# Patient Record
Sex: MALE | Race: WHITE | NOT HISPANIC OR LATINO | Employment: OTHER | URBAN - METROPOLITAN AREA
[De-identification: names, ages, dates, MRNs, and addresses within clinical notes are randomized per-mention and may not be internally consistent; named-entity substitution may affect disease eponyms.]

---

## 2017-01-03 DIAGNOSIS — R40.20 LOC (LOSS OF CONSCIOUSNESS): Primary | ICD-10-CM

## 2017-01-04 ENCOUNTER — OFFICE VISIT (OUTPATIENT)
Dept: ENDOCRINOLOGY | Facility: CLINIC | Age: 72
End: 2017-01-04
Payer: MEDICARE

## 2017-01-04 VITALS
SYSTOLIC BLOOD PRESSURE: 122 MMHG | HEIGHT: 61 IN | DIASTOLIC BLOOD PRESSURE: 68 MMHG | HEART RATE: 84 BPM | BODY MASS INDEX: 32.84 KG/M2 | WEIGHT: 173.94 LBS

## 2017-01-04 DIAGNOSIS — E11.40 TYPE 2 DIABETES MELLITUS WITH DIABETIC NEUROPATHY, WITHOUT LONG-TERM CURRENT USE OF INSULIN: Primary | ICD-10-CM

## 2017-01-04 DIAGNOSIS — Z79.4 INSULIN LONG-TERM USE: ICD-10-CM

## 2017-01-04 DIAGNOSIS — I10 ESSENTIAL HYPERTENSION: ICD-10-CM

## 2017-01-04 DIAGNOSIS — R41.3 MEMORY LOSS OR IMPAIRMENT: ICD-10-CM

## 2017-01-04 DIAGNOSIS — R80.9 PROTEINURIA: ICD-10-CM

## 2017-01-04 DIAGNOSIS — N18.30 CKD (CHRONIC KIDNEY DISEASE) STAGE 3, GFR 30-59 ML/MIN: ICD-10-CM

## 2017-01-04 PROCEDURE — 1159F MED LIST DOCD IN RCRD: CPT | Mod: S$GLB,,, | Performed by: NURSE PRACTITIONER

## 2017-01-04 PROCEDURE — 99999 PR PBB SHADOW E&M-EST. PATIENT-LVL IV: CPT | Mod: PBBFAC,,, | Performed by: NURSE PRACTITIONER

## 2017-01-04 PROCEDURE — 3078F DIAST BP <80 MM HG: CPT | Mod: S$GLB,,, | Performed by: NURSE PRACTITIONER

## 2017-01-04 PROCEDURE — 99214 OFFICE O/P EST MOD 30 MIN: CPT | Mod: S$GLB,,, | Performed by: NURSE PRACTITIONER

## 2017-01-04 PROCEDURE — 1160F RVW MEDS BY RX/DR IN RCRD: CPT | Mod: S$GLB,,, | Performed by: NURSE PRACTITIONER

## 2017-01-04 PROCEDURE — 3074F SYST BP LT 130 MM HG: CPT | Mod: S$GLB,,, | Performed by: NURSE PRACTITIONER

## 2017-01-04 PROCEDURE — 3046F HEMOGLOBIN A1C LEVEL >9.0%: CPT | Mod: S$GLB,,, | Performed by: NURSE PRACTITIONER

## 2017-01-04 PROCEDURE — 2022F DILAT RTA XM EVC RTNOPTHY: CPT | Mod: S$GLB,,, | Performed by: NURSE PRACTITIONER

## 2017-01-04 PROCEDURE — 1157F ADVNC CARE PLAN IN RCRD: CPT | Mod: S$GLB,,, | Performed by: NURSE PRACTITIONER

## 2017-01-04 PROCEDURE — 1126F AMNT PAIN NOTED NONE PRSNT: CPT | Mod: S$GLB,,, | Performed by: NURSE PRACTITIONER

## 2017-01-04 PROCEDURE — 4010F ACE/ARB THERAPY RXD/TAKEN: CPT | Mod: S$GLB,,, | Performed by: NURSE PRACTITIONER

## 2017-01-04 NOTE — MR AVS SNAPSHOT
[facility] Endocrinology  [address] [facility] [address] [place] [address]  Phone: [phone]  Fax: [phone]                  Chema Ambrose   2017 7:30 AM   Office Visit    Description:  Male : 1945   Provider:  TALIB Kelsey,ANP-C   Department:  [place] - Endocrinology           Reason for Visit     Diabetes Mellitus           Diagnoses this Visit        Comments    Type 2 diabetes mellitus with diabetic neuropathy, without long-term current use of insulin    -  Primary     Essential hypertension         CKD (chronic kidney disease) stage 3, GFR 30-59 ml/min         Proteinuria         Dystrophic nail         Insulin long-term use                To Do List           Future Appointments        Provider Department Dept Phone    2017 10:00 AM Pari Addison MD [facility] [phone]    2017 10:00 AM Jelani Grajeda MD [facility] Cardiology [phone]    2/3/2017 3:00 PM TALIB Kelsey,ANP-C [facility] Endocrinology [phone]    2017 11:00 AM Shahriar Moore DPM [facility] Podiatry [phone]    3/30/2017 10:00 AM [facility] [place] [facility] [phone]      Goals (5 Years of Data)     None      [facility]     [facility] - 24/7 Assistance  Registered nurses in the [facility] provide clinical advisement, health education, appointment booking, and other advisory services.  Call for this free service at [phone].             Medications           Message regarding Medications     Verify the changes and/or additions to your medication regime listed below are the same as discussed with your clinician today.  If any of these changes or additions are incorrect, please notify your healthcare provider.             Verify that the below list of medications is an accurate representation of the medications you are currently taking.  If none reported, the list may be blank. If incorrect,  "please contact your healthcare provider. Carry this list with you in case of emergency.           Current Medications     albuterol (PROVENTIL/VENTOLIN) 90 mcg/Actuation inhaler Inhale 2 puffs into the lungs every 4 (four) hours as needed. Inhale every 4 to 6 hours as needed for wheezing/cough     albuterol 90 mcg/actuation inhaler Inhale 2 puffs into the lungs.    aspirin (ECOTRIN) 81 MG EC tablet Take 81 mg by mouth.    atorvastatin (LIPITOR) 40 MG tablet Take 1 tablet (40 mg total) by mouth once daily.    blood glucose control, normal Soln     blood sugar diagnostic Strp TRue result test strips.  Pt on insulin checks TID.    BREEZE 2 TEST STRIPS Strp use as directed twice a day    CALCIUM CARBONATE/VITAMIN D3 (OS-KALEIGH 500 + D ORAL) Three times a day    cholecalciferol, vitamin D3, (VITAMIN D3) 2,000 unit Cap Take by mouth.    fluticasone (FLONASE) 50 mcg/actuation nasal spray INHALE 1 SPRAY INTO EACH NOSTRIL EVERY DAY -APPOINTMENT NEEDED FOR FUTURE FILLS-    fluticasone-vilanterol (BREO) 200-25 mcg/dose DsDv diskus inhaler Inhale 1 puff into the lungs once daily.    insulin glargine (LANTUS) 100 unit/mL injection Inject 30 Units into the skin every evening.    insulin NPH-insulin regular, 70/30, (NOVOLIN 70/30) 100 unit/mL (70-30) injection Take 80  units 30 min before breakfast, Take 35 units 30 min before supper    lisinopril-hydrochlorothiazide (PRINZIDE,ZESTORETIC) 10-12.5 mg per tablet Take 1 tablet by mouth once daily.    metoprolol succinate (TOPROL-XL) 25 MG 24 hr tablet Take 1 tablet (25 mg total) by mouth once daily.    mometasone (ASMANEX TWISTHALER) 220 mcg (30 doses) inhaler Inhale 2 puffs into the lungs every evening.    pantoprazole (PROTONIX) 40 MG tablet TAKE 1 TABLET (40 MG TOTAL) BY MOUTH ONCE DAILY    TRUEPLUS INSULIN 1/2 mL 31 x 5/16" Syrg USE TWICE A DAY WITH INSULIN           Clinical Reference Information           Vital Signs - Last Recorded  Most recent update: 1/4/2017  7:33 AM by " "Daniela Bautista, LPN    BP Pulse Ht Wt BMI    122/68 (BP Location: Right arm, Patient Position: Sitting, BP Method: Manual) 84 5' 1" (1.549 m) 78.9 kg (173 lb 15.1 oz) 32.87 kg/m2      Blood Pressure          Most Recent Value    BP  122/68      Allergies as of 1/4/2017     No Known Allergies      Immunizations Administered on Date of Encounter - 1/4/2017     None      Orders Placed During Today's Visit     Future Labs/Procedures Expected by Expires    Hemoglobin A1c  1/4/2017 1/4/2018      [facility] Sign-Up     Activating your [facility] account is as easy as 1-2-3!     1) Visit [email or web address], select Sign Up Now, enter this activation code and your date of birth, then select Next.  [email or web address]  Expires: 2/3/2017  5:53 PM      2) Create a username and password to use when you visit [facility] in the future and select a security question in case you lose your password and select Next.    3) Enter your e-mail address and click Sign Up!    Additional Information  If you have questions, please e-mail [email or web address] or call [phone] to talk to our [facility] staff. Remember, [facility] is NOT to be used for urgent needs. For medical emergencies, dial 911.         Instructions    MUST BRING GLUCOSE LOGS.    MUST CHECK AT LEAST TWICE A DAY AND ALTERNATE BETWEEN BREAKFAST AND SUPPER ONE DAY    LUNCH AND BEDTIME THE NEXT FOR AT LEAST 2 WEEKS BEFORE APPT.        "

## 2017-01-04 NOTE — PROGRESS NOTES
Subjective:      Patient ID: Chema Ambrose is a 71 y.o. male.    Chief Complaint:  Routine DM f/u     History of Present Illness  CHIEF COMPLAINT: Type 2 diabetes    Pt is a 68 y/o wm with Type 2 DM since at least 2004, as well as chronic conditions pending review including HTN, HLP, neuropathy, and Osteopenia. Pt with limited cognitive abilities, poor historian. .  Pt has previously stated   wife often does not cook, with a hx of eatting a lot of potted meat and fruit cups.      Interim Events:  No acute events.   Pt back today for glucose log  Review.  He has no glucoses log.  His wife deals with his medications etc but she opted to stay in waiting room.  No change in ROS.  States only checking qam and does not remember numbers.  States compliant with insulin.         Diabetes Flow Sheet:   Diabetes Medications:  Novolin 70/30--66 units before breakfast, 30 before supper.   Prior Regimen--- Novolog 70/30  30 with breakfast, 40 with supper        suspected pt missing most shots. :Lantus 22 and NovoLog 10/12/12 with meals   Diabetes Complications: neuropathy   Aspirin: yes   Statin: yes- crestor   ACE/ARB: yes   Last Urine Microalbumin: 5/13   Last Eye exam: 8/13- [facility]   Last Diabetic Education:         PAST MEDICAL HISTORY: Type 2 diabetes for 10 years with neuropathy, osteopenia, hyperlipidemia, hypertension, GERD, sleep apnea using a CPAP machine, obesity, low testosterone    PAST SURGICAL HISTORY: Right CEA. Left CEA    SOCIAL HISTORY: He does use chewing tobacco. No alcohol use    FAMILY HISTORY: Diabetes      Review of Systems   Constitutional: Negative for activity change, appetite change, fatigue and unexpected weight change.   HENT: Negative for hearing loss and trouble swallowing.    Eyes: Negative for photophobia and visual disturbance.        Eye exam--States 2016--outside eye doc   Respiratory: Negative for cough and shortness of breath.    Cardiovascular: Negative for chest pain, palpitations  "and leg swelling.   Gastrointestinal: Positive for constipation (occassional ). Negative for diarrhea.   Endocrine: Negative for polydipsia and polyuria (polyuria much improved. ).   Genitourinary: Negative for difficulty urinating and frequency.   Musculoskeletal: Negative for arthralgias and joint swelling.   Skin: Negative for rash and wound.   Neurological: Positive for numbness (feet). Negative for weakness.   Psychiatric/Behavioral: Negative for agitation and sleep disturbance. The patient is not nervous/anxious.        Objective:   Physical Exam   Constitutional: He is oriented to person, place, and time. He appears well-developed and well-nourished. No distress.   Appears approx age though fragile, borderline disheveled.    HENT:   Head: Normocephalic and atraumatic.   Nose: Nose normal.   Mouth/Throat: Oropharynx is clear and moist.   Eyes: Conjunctivae and EOM are normal. Pupils are equal, round, and reactive to light.   Neck: Normal range of motion. Neck supple. No tracheal deviation present. No thyromegaly present.   Cardiovascular: Normal rate and regular rhythm.  Exam reveals no friction rub.    Murmur heard.  Pulmonary/Chest: Effort normal and breath sounds normal. No respiratory distress. He has no wheezes.   Musculoskeletal: Normal range of motion. He exhibits no edema.        Lymphadenopathy:     He has no cervical adenopathy.   Neurological: He is alert and oriented to person, place, and time. He has normal reflexes. No cranial nerve deficit. He exhibits normal muscle tone. Coordination normal.   Skin: Skin is warm and dry. No rash noted. He is not diaphoretic. No erythema.   Psychiatric: He has a normal mood and affect. His behavior is normal. Judgment and thought content normal.     Vital Signs  Pulse: 84  BP: 122/68  BP Location: Right arm  BP Method: Manual  Patient Position: Sitting  Pain Score: 0-No pain  Height and Weight  Height: 5' 1" (154.9 cm)  Weight: 78.9 kg (173 lb 15.1 oz)  BSA " (Calculated - sq m): 1.84 sq meters  BMI (Calculated): 32.9  Weight in (lb) to have BMI = 25: 132]      Lab Review:     Chemistry        Component Value Date/Time     12/27/2016 1149    K 4.8 12/27/2016 1149     12/27/2016 1149    CO2 25 12/27/2016 1149    BUN 24 (H) 12/27/2016 1149    CREATININE 1.3 12/27/2016 1149     (H) 12/27/2016 1149        Component Value Date/Time    CALCIUM 9.2 12/27/2016 1149    ALKPHOS 115 12/27/2016 1149    AST 36 12/27/2016 1149    ALT 44 12/27/2016 1149    BILITOT 1.4 (H) 12/27/2016 1149        Hemoglobin A1C   Date Value Ref Range Status   12/15/2016 11.6 (H) 0.0 - 5.6 % Final     Comment:     Reference Interval:  5.0 - 5.6 Normal   5.7 - 6.4 High Risk   > 6.5 Diabetic    Hgb A1c results are standardized based on the (NGSP) National   Glycohemoglobin Standardization Program.    Hemoglobin A1C levels are related to mean serum/plasma glucose   during the preceding 2-3 months.        12/15/2016 11.2 (H) 4.5 - 6.2 % Final     Comment:     According to ADA guidelines, hemoglobin A1C <7.0% represents  optimal control in non-pregnant diabetic patients.  Different  metrics may apply to specific populations.   Standards of Medical Care in Diabetes - 2016.  For the purpose of screening for the presence of diabetes:  <5.7%     Consistent with the absence of diabetes  5.7-6.4%  Consistent with increasing risk for diabetes   (prediabetes)  >or=6.5%  Consistent with diabetes  Currently no consensus exists for use of hemoglobin A1C  for diagnosis of diabetes for children.     10/05/2016 9.5 (H) 4.5 - 6.2 % Final     Comment:     According to ADA guidelines, hemoglobin A1C <7.0% represents  optimal control in non-pregnant diabetic patients.  Different  metrics may apply to specific populations.   Standards of Medical Care in Diabetes - 2016.  For the purpose of screening for the presence of diabetes:  <5.7%     Consistent with the absence of diabetes  5.7-6.4%  Consistent with  increasing risk for diabetes   (prediabetes)  >or=6.5%  Consistent with diabetes  Currently no consensus exists for use of hemoglobin A1C  for diagnosis of diabetes for children.       Lab Results   Component Value Date    LDLCALC 58.0 (L) 12/16/2016     Lab Results   Component Value Date    TSH 2.300 12/15/2016     Component      Latest Ref Rng & Units 12/15/2016   Microalbum.,U,Random      ug/mL 874.0   Creatinine, Random Ur      23.0 - 375.0 mg/dL 114.0   Microalb Creat Ratio      0.0 - 30.0 ug/mg 766.7 (H)       Assessment:     1. Type 2 diabetes mellitus with diabetic neuropathy, without long-term current use of insulin  Hemoglobin A1c   2. Essential hypertension  -chronic-stable-cont ARB    3. CKD (chronic kidney disease) stage 3, GFR 30-59 ml/min  -qgkgyioo-etokvc-ludu ARB   4. Polyneuropathy associated with underlying disease  -chronic-stable-monitor foot care    5. Memory loss or impairment  -chronic-limits ability for tight glucose control    6. Hyperlipidemia, unspecified hyperlipidemia type  -chronic-stable-cont statin    7. Proteinuria  -chronic-stable-cont ARB               Plan:     No change    Conintue    Novolin 70/30-----  (this is NOT Novolog 70/30)   66 units 30 min before breakfast    Continue supper dose to 30 units   30 min before supper-    Written instructions for wife:   MUST BRING GLUCOSE LOGS.    MUST CHECK AT LEAST TWICE A DAY AND ALTERNATE BETWEEN BREAKFAST AND SUPPER ONE DAY    LUNCH AND BEDTIME THE NEXT FOR AT LEAST 2 WEEKS BEFORE APPT.       ORDERS    1/4/17  3 mo with a1c prior

## 2017-01-04 NOTE — PATIENT INSTRUCTIONS
MUST BRING GLUCOSE LOGS.    MUST CHECK AT LEAST TWICE A DAY AND ALTERNATE BETWEEN BREAKFAST AND SUPPER ONE DAY    LUNCH AND BEDTIME THE NEXT FOR AT LEAST 2 WEEKS BEFORE APPT.

## 2017-01-06 ENCOUNTER — OFFICE VISIT (OUTPATIENT)
Dept: PRIMARY CARE CLINIC | Facility: CLINIC | Age: 72
End: 2017-01-06
Payer: MEDICARE

## 2017-01-06 ENCOUNTER — LAB VISIT (OUTPATIENT)
Dept: LAB | Facility: HOSPITAL | Age: 72
End: 2017-01-06
Attending: FAMILY MEDICINE
Payer: MEDICARE

## 2017-01-06 VITALS
HEART RATE: 37 BPM | HEIGHT: 61 IN | BODY MASS INDEX: 33.17 KG/M2 | WEIGHT: 175.69 LBS | TEMPERATURE: 98 F | DIASTOLIC BLOOD PRESSURE: 80 MMHG | RESPIRATION RATE: 18 BRPM | SYSTOLIC BLOOD PRESSURE: 181 MMHG

## 2017-01-06 DIAGNOSIS — I50.32 CHRONIC DIASTOLIC CONGESTIVE HEART FAILURE: ICD-10-CM

## 2017-01-06 DIAGNOSIS — J45.41 MODERATE PERSISTENT ASTHMA WITH ACUTE EXACERBATION: ICD-10-CM

## 2017-01-06 DIAGNOSIS — R55 POSTURAL DIZZINESS WITH PRESYNCOPE: ICD-10-CM

## 2017-01-06 DIAGNOSIS — Z91.148 NONCOMPLIANCE W/MEDICATION TREATMENT DUE TO INTERMIT USE OF MEDICATION: ICD-10-CM

## 2017-01-06 DIAGNOSIS — R40.20 LOC (LOSS OF CONSCIOUSNESS): ICD-10-CM

## 2017-01-06 DIAGNOSIS — R00.1 BRADYCARDIA: Primary | ICD-10-CM

## 2017-01-06 DIAGNOSIS — R42 POSTURAL DIZZINESS WITH PRESYNCOPE: ICD-10-CM

## 2017-01-06 LAB
ALBUMIN SERPL BCP-MCNC: 3.3 G/DL
ALP SERPL-CCNC: 99 U/L
ALT SERPL W/O P-5'-P-CCNC: 27 U/L
ANION GAP SERPL CALC-SCNC: 13 MMOL/L
AST SERPL-CCNC: 21 U/L
BASOPHILS # BLD AUTO: 0.04 K/UL
BASOPHILS NFR BLD: 0.4 %
BILIRUB SERPL-MCNC: 0.8 MG/DL
BUN SERPL-MCNC: 37 MG/DL
CALCIUM SERPL-MCNC: 9.1 MG/DL
CHLORIDE SERPL-SCNC: 104 MMOL/L
CO2 SERPL-SCNC: 25 MMOL/L
CREAT SERPL-MCNC: 1.4 MG/DL
DIFFERENTIAL METHOD: ABNORMAL
EOSINOPHIL # BLD AUTO: 0.1 K/UL
EOSINOPHIL NFR BLD: 1.2 %
ERYTHROCYTE [DISTWIDTH] IN BLOOD BY AUTOMATED COUNT: 13.7 %
EST. GFR  (AFRICAN AMERICAN): 57 ML/MIN/1.73 M^2
EST. GFR  (NON AFRICAN AMERICAN): 49 ML/MIN/1.73 M^2
GLUCOSE SERPL-MCNC: 87 MG/DL
HCT VFR BLD AUTO: 37 %
HGB BLD-MCNC: 12.6 G/DL
LYMPHOCYTES # BLD AUTO: 2.1 K/UL
LYMPHOCYTES NFR BLD: 21.3 %
MAGNESIUM SERPL-MCNC: 1.4 MG/DL
MCH RBC QN AUTO: 29.8 PG
MCHC RBC AUTO-ENTMCNC: 34.1 %
MCV RBC AUTO: 88 FL
MONOCYTES # BLD AUTO: 0.8 K/UL
MONOCYTES NFR BLD: 8.2 %
NEUTROPHILS # BLD AUTO: 6.9 K/UL
NEUTROPHILS NFR BLD: 68.9 %
PLATELET # BLD AUTO: 343 K/UL
PMV BLD AUTO: 9.6 FL
POTASSIUM SERPL-SCNC: 4.5 MMOL/L
PROT SERPL-MCNC: 6.8 G/DL
RBC # BLD AUTO: 4.23 M/UL
SODIUM SERPL-SCNC: 142 MMOL/L
WBC # BLD AUTO: 10.04 K/UL

## 2017-01-06 PROCEDURE — 99999 PR PBB SHADOW E&M-EST. PATIENT-LVL III: CPT | Mod: PBBFAC,,, | Performed by: FAMILY MEDICINE

## 2017-01-06 PROCEDURE — 99499 UNLISTED E&M SERVICE: CPT | Mod: S$GLB,,, | Performed by: FAMILY MEDICINE

## 2017-01-06 PROCEDURE — 80053 COMPREHEN METABOLIC PANEL: CPT | Mod: PO

## 2017-01-06 PROCEDURE — 99214 OFFICE O/P EST MOD 30 MIN: CPT | Mod: S$GLB,,, | Performed by: FAMILY MEDICINE

## 2017-01-06 PROCEDURE — 85025 COMPLETE CBC W/AUTO DIFF WBC: CPT | Mod: PO

## 2017-01-06 PROCEDURE — 83735 ASSAY OF MAGNESIUM: CPT | Mod: PO

## 2017-01-06 RX ORDER — HYDRALAZINE HYDROCHLORIDE 25 MG/1
25 TABLET, FILM COATED ORAL 2 TIMES DAILY
Qty: 60 TABLET | Refills: 2 | Status: SHIPPED | OUTPATIENT
Start: 2017-01-06 | End: 2017-04-12 | Stop reason: SDUPTHER

## 2017-01-06 NOTE — PROGRESS NOTES
"Subjective:       Patient ID: Chema Ambrose is a 71 y.o. male.    Chief Complaint: No chief complaint on file.    Transitional Care Note    Family and/or Caretaker present at visit?  Yes. wife  Diagnostic tests reviewed/disposition: I have reviewed all completed as well as pending diagnostic tests at the time of discharge.  Disease/illness education: acute copd   Home health/community services discussion/referrals: Patient has home health established at [facility].   Establishment or re-establishment of referral orders for community resources: No other necessary community resources.   Discussion with other health care providers: No discussion with other health care providers necessary.       *Again the same scenario, does not know any of his medications, not sure why he was in the hospital, very poor discharge situation  and high risk readmission for the patient  Disheveled, wife tries but poor medical insight and information as well  First question, to me" can i still chew tobacco" while his HR is 37, denies shortness of breath or lightheadedness    Patient was hospitalized at [facility] recently. I have the hospital records and reviewed them thoroughly. During our Priority Care visit, we discussed how patient has been doing post discharge and identified any post discharge needs and concerns. I have also personally in addition to my nurse have reviewed and reconciled every medication before and after discharge. The patient has a clear understanding of what medications are currently to be taken after time was spent clarifying or correcting the medication reconciliation. The coordination of care far as follow up appointments, home health, or DME services have been done. My priority care team nursing will do follow up 24 or 48 hour phone call after this visit.       Admit Date: 12/15/2016     Discharge Date and Time: 12/20/2016 6:46 PM     Attending Physician: Leigha Denney MD      Reason for " Admission:       Chief Complaint   Patient presents with    Loss of Consciousness       had labs done at [facility], was walking down the [place] and was swaying back and forth in the [address], brought back to sit down and 911 called.          Procedures Performed: * No surgery found *     Hospital Course: The patient is a 71 year old male with history of DM2, hypertension, hyperlipidemia, LISSETH, GERD, CKD stage 3, was admitted for near syncopal episode while at [facility] on 12/15 for blood draw. He was noted to be bradycardic and hypotensive and was sent to ER for further evaluation. CT scan of head showed subacute to chronic infarct in the R occipital lobe. Cardio and Neurology were consulted. MRI of brain showed chronic/remote ischemia with no acute finding. MRA of the brain and carotid doppler were unrevealing. ECHO showed N SF with diastolic dysfunction. Beta blocker was held and HR improved. He too complained of shortness of breath with wheezing for which Pulmo was consulted. He was given empiric IV steroids, bronchodilators and cough medications. His symptoms improved and steroids were tapered. Glucose was uncontrolled. His hemoglobin a1c is 11 and unable to give Novolin 70/30 in hospital. He was given Levemir and Novolog pre meals with ISS. DM educator and dietitian consulted. Patient follow up with endocrinologist. His home Lantus dose was increased and continued on his Novolin 70/30 dosage. He was instructed to check his glucose levels, record and show to Endocrinology. Patient was noted to be tachycardic and BP elevated. Lower dose metoprolol was started and HR ranged from 70-80. BP more controlled (off norvasc, hydralazine, lisinopril, losartan/hctz). He was instructed to check his BP daily, record and show to PCP. Patient was cleared to be discharged with stable vital signs.      Consults: cardiology, pulmonary/intensive care, DM educator, dietitian     Significant Diagnostic Studies: Labs:   BMP:  Sodium 140, K 4.3, chloride 101, bicarb 30, glucose 162, BUN 32, Creatinine 1.49, Ca 8.4  CBC: WBC 11.9, H/H 13.139.6, platelet 396  , Lipid Panel         Lab Results   Component Value Date     CHOL 109 (L) 12/16/2016     HDL 23 (L) 12/16/2016     LDLCALC 58.0 (L) 12/16/2016     TRIG 140 12/16/2016     CHOLHDL 21.1 12/16/2016   , Troponin   Recent Labs  Lab 12/16/16  0330   TROPONINI 0.013    and A1C: 11.6  TSH: 2.3, BNP: 554  ECHO:   1 - Normal left ventricular systolic function (EF 60-65%).     2 - Left ventricular diastolic dysfunction.     3 - Concentric hypertrophy.     4 - Mild aortic regurgitation.           Imaging Results                 X-Ray Chest PA And Lateral (Final result) Result time: 12/20/16 08:26:56          Final result by Ander Cheng MD (12/20/16 08:26:56)          Impression:           Partial resolution of the left midlung zone subsegmental atelectasis.        Electronically signed by: ANDER CHENG MD  Date:     12/20/16  Time:    08:26           Narrative:     History: Left upper lobe atelectasis.     Procedure: Chest 2 views     Findings:     Examination is compared with a study of 12/15/16.     There is partial clearing of the left mid and upper lobe subsegmental atelectasis since the previous study. Lungs are better inflated. Partial obscuration left heart border remains without significant change. There is no pleural effusion. Heart is at the upper limits of normal. There is atherosclerosis of the descending thoracic aorta.                           US Carotid Bilateral (Final result) Result time: 12/16/16 12:49:46          Final result by GABE Myers MD (12/16/16 12:49:46)          Impression:           No hemodynamically significant stenosis.        Electronically signed by: GABE MYRES MD  Date:     12/16/16  Time:    12:49           Narrative:     Exam: Bilateral carotid Doppler ultrasound     Indication: Near syncope     Technique: Bilateral carotid Doppler ultrasound was  performed.     Findings:     Peak systolic velocities measure up to 81 cm/s on the right and 108 cm/s on the left. End diastolic velocities are normal bilaterally. ICA/CCA ratios measure 1.0 on the right and 1.2 on the left. There is antegrade vertebral artery flow bilaterally. There is moderate atherosclerotic plaque right greater than left. No visible stenosis.                           MRA Brain without contrast (Final result) Result time: 12/15/16 15:39:37          Final result by Devang Soto MD (12/15/16 15:39:37)          Impression:        1. No acute vascular abnormality.         Electronically signed by: DEVANG SOTO MD  Date:     12/15/16  Time:    15:39           Narrative:     MRA of the brain without contrast     Indication: Possible CVA, unsteady gait     Technique:  3-D intracranial magnetic resonance angiography was performed. Source and mid images were presented for interpretation. There is some motion artifact on the study which degrades evaluation of fine detail.     Findings:  There is normal signal in the included vertebral and internal carotid arteries. The basilar, anterior cerebral, middle cerebral and posterior cerebral arteries are within normal limits. There is no visible aneurysm or significant stenosis. There is no focal brain parenchymal abnormality. A normal appearing right posterior communicate artery is visualized. There is no visible left posterior containing artery. There is volume loss and right occipital scoliosis.                           MRI Brain Without Contrast (Final result) Result time: 12/15/16 15:22:30          Final result by Devang Soto MD (12/15/16 15:22:30)     Impression:        1. No acute finding.  2. Findings consistent with chronic/remote ischemia.  3. Left maxillary mucosal thickening.         Electronically signed by: DEVANG SOTO MD  Date:     12/15/16  Time:    15:22           Narrative:     MRI BRAIN WITHOUT CONTRAST     Indication:  Unsteady gait     Comparison: CT of the head also performed today     Technique: Sequences obtained included sagittal T1, axial T2, axial proton density axial flair, axial diffusion-weighted, axial ADC, axial T1, and coronal flair images.      Findings:  Gliosis and encephalomalacia are seen in the right exit lobe consistent with remote trauma. There is no acute focal brain parenchymal and mildly.      Diffuse volume loss is present. High FLAIR signal foci in the periventricular white matter are nonspecific but probably related to chronic microvascular disease. Left maxillary mucosal thickening is present. This was not scanned on the CT from earlier today.                           X-Ray Chest 1 View (Final result) Result time: 12/15/16 14:53:30          Final result by Valorie Nails MD (12/15/16 14:53:30)          Impression:     Linear density on the left most consistent with atelectasis.        Electronically signed by: VALORIE NAILS MD  Date:     12/15/16  Time:    14:53           Narrative:     Chest 1 view     Clinical history syncope     This is compared to a previous study from 8/13/14     There is linear density on the left most consistent with atelectasis. Heart is enlarged. Costophrenic angles are clear. There is vascular calcification noted.                           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        Electronically signed by: LUC POZO MD  Date:     12/15/16  Time:    13:17      Narrative:     Procedure: CT of the head without IV contrast.     Technique: Axial images of the head were obtained without intravenous contrast administration. Coronal and sagittal reconstructions were provided. Total DLP was 564 mGy-cm. Dose lowering technique, automated exposure control, was utilized for this exam.     History:  Unsteadiness.     Comparison: None.     Findings: There is normal brain formation. There is a small area of low density in the right occipital lobe. There is no hemorrhage. There is no hydrocephalus. There is no herniation.There is internal or extra axial fluid collection. There is no evidence of intracranial hypotension.     The calvarium is intact. There is no fracture. The bilateral lenses have been replaced. The paranasal sinuses and mastoid air cells are normally developed and well aerated.                   Final Diagnoses:   Principal Problem: Acute asthma exacerbation  Secondary Diagnoses:         Active Hospital Problems     Diagnosis   POA    *Acute asthma exacerbation [J45.901]   Yes    Postural dizziness with presyncope [R42, R55]   Yes    Bradycardia [R00.1]   Yes    Type 2 diabetes mellitus with neurologic complication [E11.49]   Yes    CKD (chronic kidney disease) stage 3, GFR 30-59 ml/min [N18.3]   Yes    Hyperlipidemia [E78.5]   Yes    Hypertension [I10]   Yes       Resolved Hospital Problems     Diagnosis Date Resolved POA   No resolved problems to display.         Discharged Condition: stable     Disposition: Home or Self Care       HPI  Review of Systems   Constitutional: Positive for activity change, appetite change, diaphoresis and fatigue.   HENT: Positive for congestion, dental problem and postnasal drip.    Eyes: Negative.    Respiratory: Positive for cough and chest tightness.    Cardiovascular: Positive for leg swelling.   Gastrointestinal: Positive for diarrhea, nausea and vomiting.   Endocrine: Positive for cold intolerance.   Genitourinary: Positive for difficulty urinating.   Musculoskeletal: Positive for arthralgias, back pain and gait problem.   Skin: Positive for color change.   Neurological: Positive for light-headedness.   Psychiatric/Behavioral: Positive for confusion, decreased concentration and dysphoric mood.       Objective:       Vitals:    01/06/17 1038   BP: (!)  "181/80   Pulse: (!) 37   Resp: 18   Temp: 97.8 °F (36.6 °C)   TempSrc: Oral   Weight: 79.7 kg (175 lb 11.3 oz)   Height: 5' 1" (1.549 m)       Physical Exam   Constitutional: He is oriented to person, place, and time. No distress.   HENT:   Head: Normocephalic.   Mouth/Throat: No oropharyngeal exudate.   Eyes: Right eye exhibits no discharge. No scleral icterus.   Neck: Normal range of motion. Neck supple.   Cardiovascular: Normal rate and regular rhythm.    No murmur heard.  Pulmonary/Chest:   Poor suboptimal effort   Abdominal: Soft. Bowel sounds are normal. He exhibits no distension. There is no tenderness.   Musculoskeletal: He exhibits edema and tenderness.   Neurological: He is alert and oriented to person, place, and time. He displays abnormal reflex. Coordination abnormal.   Skin: Skin is warm. He is not diaphoretic.         Lab Results   Component Value Date    WBC 10.04 01/06/2017    HGB 12.6 (L) 01/06/2017    HCT 37.0 (L) 01/06/2017    MCV 88 01/06/2017     01/06/2017     CMP  Sodium   Date Value Ref Range Status   01/06/2017 142 136 - 145 mmol/L Final     Potassium   Date Value Ref Range Status   01/06/2017 4.5 3.5 - 5.1 mmol/L Final     Chloride   Date Value Ref Range Status   01/06/2017 104 95 - 110 mmol/L Final     CO2   Date Value Ref Range Status   01/06/2017 25 23 - 29 mmol/L Final     Glucose   Date Value Ref Range Status   01/06/2017 87 70 - 110 mg/dL Final     BUN, Bld   Date Value Ref Range Status   01/06/2017 37 (H) 8 - 23 mg/dL Final     Creatinine   Date Value Ref Range Status   01/06/2017 1.4 0.5 - 1.4 mg/dL Final     Calcium   Date Value Ref Range Status   01/06/2017 9.1 8.7 - 10.5 mg/dL Final     Total Protein   Date Value Ref Range Status   01/06/2017 6.8 6.0 - 8.4 g/dL Final     Albumin   Date Value Ref Range Status   01/06/2017 3.3 (L) 3.5 - 5.2 g/dL Final     Total Bilirubin   Date Value Ref Range Status   01/06/2017 0.8 0.1 - 1.0 mg/dL Final     Comment:     For infants and " newborns, interpretation of results should be based  on gestational age, weight and in agreement with clinical  observations.  Premature Infant recommended reference ranges:  Up to 24 hours.............<8.0 mg/dL  Up to 48 hours............<12.0 mg/dL  3-5 days..................<15.0 mg/dL  6-29 days.................<15.0 mg/dL       Alkaline Phosphatase   Date Value Ref Range Status   01/06/2017 99 55 - 135 U/L Final     AST   Date Value Ref Range Status   01/06/2017 21 10 - 40 U/L Final     ALT   Date Value Ref Range Status   01/06/2017 27 10 - 44 U/L Final     Anion Gap   Date Value Ref Range Status   01/06/2017 13 8 - 16 mmol/L Final     eGFR if    Date Value Ref Range Status   01/06/2017 57 (A) >60 mL/min/1.73 m^2 Final     eGFR if non    Date Value Ref Range Status   01/06/2017 49 (A) >60 mL/min/1.73 m^2 Final     Comment:     Calculation used to obtain the estimated glomerular filtration  rate (eGFR) is the CKD-EPI equation. Since race is unknown   in our information system, the eGFR values for   -American and Non--American patients are given   for each creatinine result.         Assessment and Plan    1. Acute asthma exacerbation  Improving    2. Bradycardia, still there up to 50 s here now   not sure if he doubling up his beta blocker  Need home health to evaluate  Saw going around this crazy [place] again, the HH says they do not have a pill box, not good and even acceptable  Get one, arrange his meds  Stop toprol  Add hydralazine 25 mg bid    3. Chronic chf,diastolic  Shockingly compensated, not sure how that happened considering the messy post d/c state    Post discharge  High risk  Home health needs to be aggressive   Non compliance  Total disconnect post discharge state

## 2017-01-06 NOTE — MR AVS SNAPSHOT
[facility]  [address]  [place] [address]  Phone: [phone]                  Chema Ambrose   2017 10:00 AM   Office Visit    Description:  Male : 1945   Provider:  Pari Addison MD   Department:  [facility]           Diagnoses this Visit        Comments    Bradycardia    -  Primary     Postural dizziness with presyncope         Noncompliance w/medication treatment due to intermit use of medication         Moderate persistent asthma with acute exacerbation         Chronic diastolic congestive heart failure                To Do List           Future Appointments        Provider Department Dept Phone    2017 10:00 AM Jelain Grajeda MD [facility] Cardiology [phone]    2/3/2017 3:00 PM TALIB Kelsey,ANP-C [facility] Endocrinology [phone]    2017 11:00 AM Shahriar Moore DPM [facility] Podiatry [phone]    3/30/2017 10:00 AM LAB, [place] [facility] [phone]    2017 8:00 AM TALIB Kelsey,ANP-C [facility] Endocrinology [phone]      Goals (5 Years of Data)     None       These Medications        Disp Refills Start End    hydrALAZINE (APRESOLINE) 25 MG tablet 60 tablet 2 2017    Take 1 tablet (25 mg total) by mouth 2 (two) times daily. - Oral    Pharmacy: [facility] [address] #: [phone]         [facility]     [facility] Nurse Care Line -  Assistance  Registered nurses in the [facility] provide clinical advisement, health education, appointment booking, and other advisory services.  Call for this free service at [phone].             Medications           Message regarding Medications     Verify the changes and/or additions to your medication regime listed below are the same as discussed with your clinician today.  If any of these changes or additions are incorrect, please notify your  healthcare provider.        START taking these NEW medications        Refills    hydrALAZINE (APRESOLINE) 25 MG tablet 2    Sig: Take 1 tablet (25 mg total) by mouth 2 (two) times daily.    Class: Normal    Route: Oral      STOP taking these medications     metoprolol succinate (TOPROL-XL) 25 MG 24 hr tablet Take 1 tablet (25 mg total) by mouth once daily.           Verify that the below list of medications is an accurate representation of the medications you are currently taking.  If none reported, the list may be blank. If incorrect, please contact your healthcare provider. Carry this list with you in case of emergency.           Current Medications     albuterol (PROVENTIL/VENTOLIN) 90 mcg/Actuation inhaler Inhale 2 puffs into the lungs every 4 (four) hours as needed. Inhale every 4 to 6 hours as needed for wheezing/cough     albuterol 90 mcg/actuation inhaler Inhale 2 puffs into the lungs.    aspirin (ECOTRIN) 81 MG EC tablet Take 81 mg by mouth.    atorvastatin (LIPITOR) 40 MG tablet Take 1 tablet (40 mg total) by mouth once daily.    blood glucose control, normal Soln     blood sugar diagnostic Strp TRue result test strips.  Pt on insulin checks TID.    BREEZE 2 TEST STRIPS Strp use as directed twice a day    CALCIUM CARBONATE/VITAMIN D3 (OS-KALEIGH 500 + D ORAL) Three times a day    cholecalciferol, vitamin D3, (VITAMIN D3) 2,000 unit Cap Take by mouth.    fluticasone (FLONASE) 50 mcg/actuation nasal spray INHALE 1 SPRAY INTO EACH NOSTRIL EVERY DAY -APPOINTMENT NEEDED FOR FUTURE FILLS-    fluticasone-vilanterol (BREO) 200-25 mcg/dose DsDv diskus inhaler Inhale 1 puff into the lungs once daily.    insulin glargine (LANTUS) 100 unit/mL injection Inject 30 Units into the skin every evening.    insulin NPH-insulin regular, 70/30, (NOVOLIN 70/30) 100 unit/mL (70-30) injection Take 80  units 30 min before breakfast, Take 35 units 30 min before supper    lisinopril-hydrochlorothiazide (PRINZIDE,ZESTORETIC) 10-12.5 mg  "per tablet Take 1 tablet by mouth once daily.    mometasone (ASMANEX TWISTHALER) 220 mcg (30 doses) inhaler Inhale 2 puffs into the lungs every evening.    pantoprazole (PROTONIX) 40 MG tablet TAKE 1 TABLET (40 MG TOTAL) BY MOUTH ONCE DAILY    TRUEPLUS INSULIN 1/2 mL 31 x 5/16" Syrg USE TWICE A DAY WITH INSULIN    hydrALAZINE (APRESOLINE) 25 MG tablet Take 1 tablet (25 mg total) by mouth 2 (two) times daily.           Clinical Reference Information           Vital Signs - Last Recorded  Most recent update: 1/6/2017 10:43 AM by Daniela Pike MA    BP Pulse Temp Resp Ht Wt    (!) 181/80 (!) 37 97.8 °F (36.6 °C) (Oral) 18 5' 1" (1.549 m) 79.7 kg (175 lb 11.3 oz)    BMI                33.2 kg/m2          Blood Pressure          Most Recent Value    BP  (!)  181/80      Allergies as of 1/6/2017     No Known Allergies      Immunizations Administered on Date of Encounter - 1/6/2017     None      [facility] Sign-Up     Activating your [facility] account is as easy as 1-2-3!     1) Visit [email or web address], select Sign Up Now, enter this activation code and your date of birth, then select Next.  [email or web address]  Expires: 2/3/2017  5:53 PM      2) Create a username and password to use when you visit [facility] in the future and select a security question in case you lose your password and select Next.    3) Enter your e-mail address and click Sign Up!    Additional Information  If you have questions, please e-mail [email or web address] or call [phone] to talk to our [facility] staff. Remember, [facility] is NOT to be used for urgent needs. For medical emergencies, dial 911.         "

## 2017-01-07 ENCOUNTER — NURSE TRIAGE (OUTPATIENT)
Dept: ADMINISTRATIVE | Facility: CLINIC | Age: 72
End: 2017-01-07

## 2017-01-07 NOTE — TELEPHONE ENCOUNTER
Per home health nurse, pulse is 44; denies symptoms including lightheadedness and dizziness.    Pt seen in MD office yesterday with a pulse of 37 and home health ordered to see patient for consecutive days for monitoring of heart rate and  Organization of medications.  Toprol was discontinued.  Per home health orders, MD to be contacted for heart rate less than 50.    Dr. Terrazas (on call provider) notified of pt vitals and home health orders; okay to continue to monitor; however, if pt becomes symptomatic he is to go to the ED.  Message relayed to home health nurse.      Provider staff - please contact pt directly for additional information and/or recommendations    Reason for Disposition   [1] Heart beating very slowly (e.g.,  < 50 / minute)  (EXCEPTION: athlete)     Pt seen in MD office yesterday with same complaint - home health ordered; nurse currently with pt; on call provider notified for recommendations.    Protocols used: ST HEART RATE AND HEART BEAT [id number]

## 2017-01-12 RX ORDER — PANTOPRAZOLE SODIUM 40 MG/1
TABLET, DELAYED RELEASE ORAL
Qty: 30 TABLET | Refills: 5 | Status: SHIPPED | OUTPATIENT
Start: 2017-01-12 | End: 2017-08-16

## 2017-01-17 ENCOUNTER — OFFICE VISIT (OUTPATIENT)
Dept: CARDIOLOGY | Facility: CLINIC | Age: 72
End: 2017-01-17
Payer: MEDICARE

## 2017-01-17 VITALS
HEART RATE: 68 BPM | SYSTOLIC BLOOD PRESSURE: 196 MMHG | HEIGHT: 61 IN | BODY MASS INDEX: 33.68 KG/M2 | DIASTOLIC BLOOD PRESSURE: 78 MMHG | WEIGHT: 178.38 LBS

## 2017-01-17 DIAGNOSIS — I77.9 BILATERAL CAROTID ARTERY DISEASE: ICD-10-CM

## 2017-01-17 DIAGNOSIS — R42 POSTURAL DIZZINESS WITH PRESYNCOPE: ICD-10-CM

## 2017-01-17 DIAGNOSIS — E11.40 TYPE 2 DIABETES MELLITUS WITH DIABETIC NEUROPATHY, WITH LONG-TERM CURRENT USE OF INSULIN: ICD-10-CM

## 2017-01-17 DIAGNOSIS — N18.30 CKD (CHRONIC KIDNEY DISEASE) STAGE 3, GFR 30-59 ML/MIN: ICD-10-CM

## 2017-01-17 DIAGNOSIS — E78.5 HYPERLIPIDEMIA, UNSPECIFIED HYPERLIPIDEMIA TYPE: ICD-10-CM

## 2017-01-17 DIAGNOSIS — Z79.4 TYPE 2 DIABETES MELLITUS WITH DIABETIC NEUROPATHY, WITH LONG-TERM CURRENT USE OF INSULIN: ICD-10-CM

## 2017-01-17 DIAGNOSIS — R55 POSTURAL DIZZINESS WITH PRESYNCOPE: ICD-10-CM

## 2017-01-17 DIAGNOSIS — I10 ESSENTIAL HYPERTENSION: Primary | ICD-10-CM

## 2017-01-17 PROCEDURE — 1126F AMNT PAIN NOTED NONE PRSNT: CPT | Mod: S$GLB,,, | Performed by: INTERNAL MEDICINE

## 2017-01-17 PROCEDURE — 99214 OFFICE O/P EST MOD 30 MIN: CPT | Mod: S$GLB,,, | Performed by: INTERNAL MEDICINE

## 2017-01-17 PROCEDURE — 1157F ADVNC CARE PLAN IN RCRD: CPT | Mod: S$GLB,,, | Performed by: INTERNAL MEDICINE

## 2017-01-17 PROCEDURE — 1159F MED LIST DOCD IN RCRD: CPT | Mod: S$GLB,,, | Performed by: INTERNAL MEDICINE

## 2017-01-17 PROCEDURE — 3046F HEMOGLOBIN A1C LEVEL >9.0%: CPT | Mod: S$GLB,,, | Performed by: INTERNAL MEDICINE

## 2017-01-17 PROCEDURE — 1160F RVW MEDS BY RX/DR IN RCRD: CPT | Mod: S$GLB,,, | Performed by: INTERNAL MEDICINE

## 2017-01-17 PROCEDURE — 2022F DILAT RTA XM EVC RTNOPTHY: CPT | Mod: S$GLB,,, | Performed by: INTERNAL MEDICINE

## 2017-01-17 PROCEDURE — 3078F DIAST BP <80 MM HG: CPT | Mod: S$GLB,,, | Performed by: INTERNAL MEDICINE

## 2017-01-17 PROCEDURE — 99499 UNLISTED E&M SERVICE: CPT | Mod: S$GLB,,, | Performed by: INTERNAL MEDICINE

## 2017-01-17 PROCEDURE — 99999 PR PBB SHADOW E&M-EST. PATIENT-LVL III: CPT | Mod: PBBFAC,,, | Performed by: INTERNAL MEDICINE

## 2017-01-17 PROCEDURE — 4010F ACE/ARB THERAPY RXD/TAKEN: CPT | Mod: S$GLB,,, | Performed by: INTERNAL MEDICINE

## 2017-01-17 PROCEDURE — 3077F SYST BP >= 140 MM HG: CPT | Mod: S$GLB,,, | Performed by: INTERNAL MEDICINE

## 2017-01-17 RX ORDER — LISINOPRIL AND HYDROCHLOROTHIAZIDE 20; 25 MG/1; MG/1
1 TABLET ORAL DAILY
Qty: 90 TABLET | Refills: 3 | Status: SHIPPED | OUTPATIENT
Start: 2017-01-17 | End: 2017-08-16

## 2017-01-17 NOTE — MR AVS SNAPSHOT
[facility] Cardiology  [address] [facility] [address]  [facility] [address]  Phone: [phone]                  Chema Ambrose   2017 10:00 AM   Office Visit    Description:  Male : 1945   Provider:  Jelani Grajeda MD   Department:  [facility] - Cardiology           Reason for Visit     Loss of Consciousness     Shortness of Breath     Chronic Kidney Disease     Bradycardia           Diagnoses this Visit        Comments    Essential hypertension    -  Primary     Bilateral carotid artery disease         Postural dizziness with presyncope         Type 2 diabetes mellitus with diabetic neuropathy, with long-term current use of insulin         Hyperlipidemia, unspecified hyperlipidemia type         CKD (chronic kidney disease) stage 3, GFR 30-59 ml/min                To Do List           Future Appointments        Provider Department Dept Phone    2017 10:00 AM Jelani Grajeda MD [facility] Cardiology [phone]    2/3/2017 3:00 PM TALIB Kelsey,KAYLI [facility] Endocrinology [phone]    2017 11:00 AM Shahriar Moore DPM [facility] Podiatry [phone]    3/30/2017 10:00 AM MC, [place] [facility] [phone]    2017 8:00 AM TALIB Kelsey,KAYLI [facility] Endocrinology [phone]      Goals (5 Years of Data)     None      Follow-Up and Disposition     Return in about 3 months (around 2017).       These Medications        Disp Refills Start End    lisinopril-hydrochlorothiazide (PRINZIDE,ZESTORETIC) 20-25 mg Tab 90 tablet 3 2017    Take 1 tablet by mouth once daily. - Oral    Pharmacy: [facility] - [place] - [address] Ph #: [phone]         [facility]     [facility] On Call Nurse Care Line -  Assistance  Registered nurses in the [facility] On Call Center provide clinical advisement, health education, appointment booking, and other advisory services.  Call for this free service at  [phone].             Medications           Message regarding Medications     Verify the changes and/or additions to your medication regime listed below are the same as discussed with your clinician today.  If any of these changes or additions are incorrect, please notify your healthcare provider.        START taking these NEW medications        Refills    lisinopril-hydrochlorothiazide (PRINZIDE,ZESTORETIC) 20-25 mg Tab 3    Sig: Take 1 tablet by mouth once daily.    Class: Normal    Route: Oral      STOP taking these medications     lisinopril-hydrochlorothiazide (PRINZIDE,ZESTORETIC) 10-12.5 mg per tablet Take 1 tablet by mouth once daily.           Verify that the below list of medications is an accurate representation of the medications you are currently taking.  If none reported, the list may be blank. If incorrect, please contact your healthcare provider. Carry this list with you in case of emergency.           Current Medications     albuterol 90 mcg/actuation inhaler Inhale 2 puffs into the lungs.    aspirin (ECOTRIN) 81 MG EC tablet Take 81 mg by mouth.    atorvastatin (LIPITOR) 40 MG tablet Take 1 tablet (40 mg total) by mouth once daily.    blood glucose control, normal Soln     blood sugar diagnostic Strp TRue result test strips.  Pt on insulin checks TID.    BREEZE 2 TEST STRIPS Strp use as directed twice a day    CALCIUM CARBONATE/VITAMIN D3 (OS-KALEIGH 500 + D ORAL) Three times a day    cholecalciferol, vitamin D3, (VITAMIN D3) 2,000 unit Cap Take by mouth.    fluticasone (FLONASE) 50 mcg/actuation nasal spray INHALE 1 SPRAY INTO EACH NOSTRIL EVERY DAY -APPOINTMENT NEEDED FOR FUTURE FILLS-    fluticasone-vilanterol (BREO) 200-25 mcg/dose DsDv diskus inhaler Inhale 1 puff into the lungs once daily.    hydrALAZINE (APRESOLINE) 25 MG tablet Take 1 tablet (25 mg total) by mouth 2 (two) times daily.    insulin glargine (LANTUS) 100 unit/mL injection Inject 30 Units into the skin every evening.     "insulin NPH-insulin regular, 70/30, (NOVOLIN 70/30) 100 unit/mL (70-30) injection Take 80  units 30 min before breakfast, Take 35 units 30 min before supper    mometasone (ASMANEX TWISTHALER) 220 mcg (30 doses) inhaler Inhale 2 puffs into the lungs every evening.    pantoprazole (PROTONIX) 40 MG tablet TAKE 1 TABLET (40 MG TOTAL) BY MOUTH ONCE DAILY    TRUEPLUS INSULIN 1/2 mL 31 x 5/16" Syrg USE TWICE A DAY WITH INSULIN    albuterol (PROVENTIL/VENTOLIN) 90 mcg/Actuation inhaler Inhale 2 puffs into the lungs every 4 (four) hours as needed. Inhale every 4 to 6 hours as needed for wheezing/cough     lisinopril-hydrochlorothiazide (PRINZIDE,ZESTORETIC) 20-25 mg Tab Take 1 tablet by mouth once daily.           Clinical Reference Information           Vital Signs - Last Recorded  Most recent update: 1/17/2017  9:27 AM by Tonia Casas LPN    BP Pulse Ht Wt BMI    (!) 196/78 68 5' 1" (1.549 m) 80.9 kg (178 lb 5.6 oz) 33.7 kg/m2      Blood Pressure          Most Recent Value    BP  (!)  196/78      Allergies as of 1/17/2017     No Known Allergies      Immunizations Administered on Date of Encounter - 1/17/2017     None      [facility] Sign-Up     Activating your [facility] account is as easy as 1-2-3!     1) Visit [email or web address], select Sign Up Now, enter this activation code and your date of birth, then select Next.  [email or web address]  Expires: 2/3/2017  5:53 PM      2) Create a username and password to use when you visit [facility] in the future and select a security question in case you lose your password and select Next.    3) Enter your e-mail address and click Sign Up!    Additional Information  If you have questions, please e-mail [email or web address] or call [phone] to talk to our [facility] staff. Remember, [facility] is NOT to be used for urgent needs. For medical emergencies, dial 911.         "

## 2017-01-17 NOTE — PROGRESS NOTES
Subjective:    Patient ID:  Chema Ambrose is a 71 y.o. male who presents for follow-up of Loss of Consciousness (hosp f/u); Shortness of Breath; Chronic Kidney Disease; and Bradycardia      HPI Comments: Pt here for hospital f/u. Last month he was admitted with a near syncopal spell after a blood draw. He was noted to be a bit bradycardic and his toprol was stopped. Echo was unremarkable and unchanged from previous study. He reports no similar symptoms prior to or since the event. He has occasional postural lightheadedness. He denies any palpitations, syncope or pre-syncope. He denies rapid or sustained rhythms. He denies any chest pain, SOB, PND, orthopnea. He denies claudication, lower extremity edema.      Review of Systems   Constitution: Negative for weight gain and weight loss.   HENT: Negative.    Eyes: Negative.    Cardiovascular: Negative for chest pain, claudication, cyanosis, dyspnea on exertion, irregular heartbeat, leg swelling, near-syncope, orthopnea (no PND), palpitations and syncope.   Respiratory: Negative for cough, hemoptysis, shortness of breath and snoring.    Endocrine: Negative.    Skin: Negative.    Musculoskeletal: Negative for joint pain, muscle cramps, muscle weakness and myalgias.   Gastrointestinal: Negative for diarrhea, hematemesis, nausea and vomiting.   Genitourinary: Negative.    Neurological: Negative for dizziness, focal weakness, light-headedness, loss of balance, numbness, paresthesias and seizures.   Psychiatric/Behavioral: Negative.         Objective:    Physical Exam   Constitutional: He is oriented to person, place, and time. He appears well-developed and well-nourished.   HENT:   Mouth/Throat: Oropharynx is clear and moist.   Eyes: Pupils are equal, round, and reactive to light.   Neck: Normal range of motion. No thyromegaly present.   Cardiovascular: Normal rate, regular rhythm, S1 normal, S2 normal, intact distal pulses and normal pulses.   No extrasystoles are  present. PMI is not displaced.  Exam reveals no friction rub.    Murmur heard.   Harsh midsystolic murmur is present with a grade of 1/6  at the upper right sternal border  Pulmonary/Chest: Effort normal and breath sounds normal. He has no wheezes. He has no rales. He exhibits no tenderness.   Abdominal: Soft. Bowel sounds are normal. He exhibits no distension and no mass. There is no tenderness.   Musculoskeletal: Normal range of motion. He exhibits no edema.   Neurological: He is alert and oriented to person, place, and time.   Skin: Skin is warm and dry.   Vitals reviewed.      Test(s) Reviewed  I have reviewed the following in detail:  [] Stress test   [] Angiography   [x] Echocardiogram   [x] Labs   [x] Other:  Holter       Assessment:       1. Essential hypertension    2. Bilateral carotid artery disease    3. Postural dizziness with presyncope    4. Type 2 diabetes mellitus with diabetic neuropathy, with long-term current use of insulin    5. Hyperlipidemia, unspecified hyperlipidemia type    6. CKD (chronic kidney disease) stage 3, GFR 30-59 ml/min         Plan:       Increase lisinopril-HCT to 20-25  Monitor BP at home  F/u 3 months

## 2017-01-31 ENCOUNTER — TELEPHONE (OUTPATIENT)
Dept: PRIMARY CARE CLINIC | Facility: CLINIC | Age: 72
End: 2017-01-31

## 2017-01-31 NOTE — TELEPHONE ENCOUNTER
Spoke w/ Efe with [facility] [phone]  He reports patient pulse has been normal (70's) however his systolic BP has been elevated (160's). Pt asymptomatic, recently saw cardiology (Dr. Grajeda) with no changes to med regimen. Please advise.

## 2017-02-01 NOTE — TELEPHONE ENCOUNTER
On week ago, Dr. Grajeda increased med to lisinopril HCT 20/25.  May take another 2 weeks to really make a difference, please call in 2 weeks with B/P reading.  Notify HH, see previous message.

## 2017-02-03 ENCOUNTER — OFFICE VISIT (OUTPATIENT)
Dept: ENDOCRINOLOGY | Facility: CLINIC | Age: 72
End: 2017-02-03
Payer: MEDICARE

## 2017-02-03 VITALS
BODY MASS INDEX: 33.38 KG/M2 | DIASTOLIC BLOOD PRESSURE: 74 MMHG | SYSTOLIC BLOOD PRESSURE: 146 MMHG | HEART RATE: 82 BPM | HEIGHT: 61 IN | WEIGHT: 176.81 LBS

## 2017-02-03 DIAGNOSIS — Z79.4 INSULIN LONG-TERM USE: ICD-10-CM

## 2017-02-03 DIAGNOSIS — I10 ESSENTIAL HYPERTENSION: ICD-10-CM

## 2017-02-03 DIAGNOSIS — G63 POLYNEUROPATHY ASSOCIATED WITH UNDERLYING DISEASE: ICD-10-CM

## 2017-02-03 DIAGNOSIS — N18.30 CKD (CHRONIC KIDNEY DISEASE) STAGE 3, GFR 30-59 ML/MIN: ICD-10-CM

## 2017-02-03 DIAGNOSIS — R41.3 MEMORY LOSS OR IMPAIRMENT: ICD-10-CM

## 2017-02-03 DIAGNOSIS — E11.40 TYPE 2 DIABETES MELLITUS WITH DIABETIC NEUROPATHY, WITH LONG-TERM CURRENT USE OF INSULIN: Primary | ICD-10-CM

## 2017-02-03 DIAGNOSIS — Z79.4 TYPE 2 DIABETES MELLITUS WITH DIABETIC NEUROPATHY, WITH LONG-TERM CURRENT USE OF INSULIN: Primary | ICD-10-CM

## 2017-02-03 PROCEDURE — 1157F ADVNC CARE PLAN IN RCRD: CPT | Mod: S$GLB,,, | Performed by: NURSE PRACTITIONER

## 2017-02-03 PROCEDURE — 4010F ACE/ARB THERAPY RXD/TAKEN: CPT | Mod: S$GLB,,, | Performed by: NURSE PRACTITIONER

## 2017-02-03 PROCEDURE — 3046F HEMOGLOBIN A1C LEVEL >9.0%: CPT | Mod: S$GLB,,, | Performed by: NURSE PRACTITIONER

## 2017-02-03 PROCEDURE — 1126F AMNT PAIN NOTED NONE PRSNT: CPT | Mod: S$GLB,,, | Performed by: NURSE PRACTITIONER

## 2017-02-03 PROCEDURE — 1159F MED LIST DOCD IN RCRD: CPT | Mod: S$GLB,,, | Performed by: NURSE PRACTITIONER

## 2017-02-03 PROCEDURE — 3078F DIAST BP <80 MM HG: CPT | Mod: S$GLB,,, | Performed by: NURSE PRACTITIONER

## 2017-02-03 PROCEDURE — 99999 PR PBB SHADOW E&M-EST. PATIENT-LVL IV: CPT | Mod: PBBFAC,,, | Performed by: NURSE PRACTITIONER

## 2017-02-03 PROCEDURE — 99214 OFFICE O/P EST MOD 30 MIN: CPT | Mod: S$GLB,,, | Performed by: NURSE PRACTITIONER

## 2017-02-03 PROCEDURE — 3077F SYST BP >= 140 MM HG: CPT | Mod: S$GLB,,, | Performed by: NURSE PRACTITIONER

## 2017-02-03 PROCEDURE — 2022F DILAT RTA XM EVC RTNOPTHY: CPT | Mod: S$GLB,,, | Performed by: NURSE PRACTITIONER

## 2017-02-03 PROCEDURE — 1160F RVW MEDS BY RX/DR IN RCRD: CPT | Mod: S$GLB,,, | Performed by: NURSE PRACTITIONER

## 2017-02-03 NOTE — PROGRESS NOTES
Subjective:      Patient ID: Chema Ambrose is a 71 y.o. male.    Chief Complaint:  Routine DM f/u     History of Present Illness  CHIEF COMPLAINT: Type 2 diabetes    Pt is a 70 y/o wm with Type 2 DM since at least 2004, as well as chronic conditions pending review including HTN, HLP, neuropathy, and Osteopenia. Pt with limited cognitive abilities, poor historian. .  .      Interim Events:  No acute events.   Pt back today for glucose log  Review.   He is accompanied by his wife.  She did bring his glucose logs and validated the times of readings.  Overall the trend indicates very tight fasting and prelunch readings but presupper and Bedtime glucoses are nearly always markedly high.  She is giving him his insulin twice a day--80 units of 70/30 before breakfast and 35 units before supper.             Diabetes Flow Sheet:   Diabetes Medications:  Novolin 70/30--80 units before breakfast, 35 before supper.   Prior Regimen--- Novolog 70/30  30 with breakfast, 40 with supper        suspected pt missing most shots. :Lantus 22 and NovoLog 10/12/12 with meals   Diabetes Complications: neuropathy   Aspirin: yes   Statin: yes- crestor   ACE/ARB: yes   Last Urine Microalbumin: 5/13   Last Eye exam: 8/13- [facility]   Last Diabetic Education:         PAST MEDICAL HISTORY: Type 2 diabetes for 10 years with neuropathy, osteopenia, hyperlipidemia, hypertension, GERD, sleep apnea using a CPAP machine, obesity, low testosterone    PAST SURGICAL HISTORY: Right CEA. Left CEA    SOCIAL HISTORY: He does use chewing tobacco. No alcohol use    FAMILY HISTORY: Diabetes      Review of Systems   Constitutional: Negative for activity change, appetite change, fatigue and unexpected weight change.   HENT: Negative for hearing loss and trouble swallowing.    Eyes: Negative for photophobia and visual disturbance.        Eye exam--States 2016--outside eye doc   Respiratory: Negative for cough and shortness of breath.    Cardiovascular: Negative  "for chest pain, palpitations and leg swelling.   Gastrointestinal: Positive for constipation (occassional ). Negative for diarrhea.   Endocrine: Negative for polydipsia and polyuria (polyuria much improved. ).   Genitourinary: Negative for difficulty urinating and frequency.   Musculoskeletal: Negative for arthralgias and joint swelling.   Skin: Negative for rash and wound.   Neurological: Positive for numbness (feet). Negative for weakness.   Psychiatric/Behavioral: Negative for agitation and sleep disturbance. The patient is not nervous/anxious.        Objective:   Physical Exam   Constitutional: He is oriented to person, place, and time. He appears well-developed and well-nourished. No distress.   Appears approx age though fragile, borderline disheveled.    HENT:   Head: Normocephalic and atraumatic.   Nose: Nose normal.   Mouth/Throat: Oropharynx is clear and moist.   Eyes: Conjunctivae and EOM are normal. Pupils are equal, round, and reactive to light.   Neck: Normal range of motion. Neck supple. No tracheal deviation present. No thyromegaly present.   Cardiovascular: Normal rate and regular rhythm.  Exam reveals no friction rub.    Murmur heard.  Pulmonary/Chest: Effort normal and breath sounds normal. No respiratory distress. He has no wheezes.   Musculoskeletal: Normal range of motion. He exhibits no edema.        Lymphadenopathy:     He has no cervical adenopathy.   Neurological: He is alert and oriented to person, place, and time. He has normal reflexes. No cranial nerve deficit. He exhibits normal muscle tone. Coordination normal.   Skin: Skin is warm and dry. No rash noted. He is not diaphoretic. No erythema.   Psychiatric: He has a normal mood and affect. His behavior is normal. Judgment and thought content normal.     Vital Signs  Pulse: 82  BP: (!) 146/74  BP Location: Right arm  BP Method: Manual  Patient Position: Sitting  Pain Score: 0-No pain  Height and Weight  Height: 5' 1" (154.9 cm)  Weight: " 80.2 kg (176 lb 12.9 oz)  BSA (Calculated - sq m): 1.86 sq meters  BMI (Calculated): 33.5  Weight in (lb) to have BMI = 25: 132]      Lab Review:     Chemistry        Component Value Date/Time     01/06/2017 0932    K 4.5 01/06/2017 0932     01/06/2017 0932    CO2 25 01/06/2017 0932    BUN 37 (H) 01/06/2017 0932    CREATININE 1.4 01/06/2017 0932    GLU 87 01/06/2017 0932        Component Value Date/Time    CALCIUM 9.1 01/06/2017 0932    ALKPHOS 99 01/06/2017 0932    AST 21 01/06/2017 0932    ALT 27 01/06/2017 0932    BILITOT 0.8 01/06/2017 0932        Hemoglobin A1C   Date Value Ref Range Status   12/15/2016 11.6 (H) 0.0 - 5.6 % Final     Comment:     Reference Interval:  5.0 - 5.6 Normal   5.7 - 6.4 High Risk   > 6.5 Diabetic    Hgb A1c results are standardized based on the (NGSP) National   Glycohemoglobin Standardization Program.    Hemoglobin A1C levels are related to mean serum/plasma glucose   during the preceding 2-3 months.        12/15/2016 11.2 (H) 4.5 - 6.2 % Final     Comment:     According to ADA guidelines, hemoglobin A1C <7.0% represents  optimal control in non-pregnant diabetic patients.  Different  metrics may apply to specific populations.   Standards of Medical Care in Diabetes - 2016.  For the purpose of screening for the presence of diabetes:  <5.7%     Consistent with the absence of diabetes  5.7-6.4%  Consistent with increasing risk for diabetes   (prediabetes)  >or=6.5%  Consistent with diabetes  Currently no consensus exists for use of hemoglobin A1C  for diagnosis of diabetes for children.     10/05/2016 9.5 (H) 4.5 - 6.2 % Final     Comment:     According to ADA guidelines, hemoglobin A1C <7.0% represents  optimal control in non-pregnant diabetic patients.  Different  metrics may apply to specific populations.   Standards of Medical Care in Diabetes - 2016.  For the purpose of screening for the presence of diabetes:  <5.7%     Consistent with the absence of diabetes  5.7-6.4%   Consistent with increasing risk for diabetes   (prediabetes)  >or=6.5%  Consistent with diabetes  Currently no consensus exists for use of hemoglobin A1C  for diagnosis of diabetes for children.       Lab Results   Component Value Date    LDLCALC 58.0 (L) 12/16/2016     Lab Results   Component Value Date    TSH 2.300 12/15/2016     Component      Latest Ref Rng & Units 12/15/2016   Microalbum.,U,Random      ug/mL 874.0   Creatinine, Random Ur      23.0 - 375.0 mg/dL 114.0   Microalb Creat Ratio      0.0 - 30.0 ug/mg 766.7 (H)       Assessment:     1. Type 2 diabetes mellitus with diabetic neuropathy, without long-term current use of insulin  Hemoglobin A1c   2. Essential hypertension  -chronic-stable-cont ARB    3. CKD (chronic kidney disease) stage 3, GFR 30-59 ml/min  -rivmynhy-repyts-lqft ARB   4. Polyneuropathy associated with underlying disease  -chronic-stable-monitor foot care    5. Memory loss or impairment  -chronic-limits ability for tight glucose control    6. Hyperlipidemia, unspecified hyperlipidemia type  -chronic-stable-cont statin    7. Proteinuria  -chronic-stable-cont ARB               Plan:   Needs split mix insulin.      75 units of NPH (with 25 units of Regular) before breakfast,  Supper ==24 NPH (  with 15 Regular)     Wife to drop off log 2/20 after being on new regimen   Printed RX provided.     ORDERS  2/3/17  Cons Diabetes Ed--split mix insulin.      F/u 3 mo withi a1c or

## 2017-02-03 NOTE — MR AVS SNAPSHOT
[facility]  [address]  [facility] [address]  Phone: [phone]  Fax: [phone]                  Chema Ambrose   2/3/2017 3:00 PM   Office Visit    Description:  Male : 1945   Provider:  TALIB Kelsey,ANPMicaC   Department:  [facility]inology           Reason for Visit     Diabetes Mellitus           Diagnoses this Visit        Comments    Type 2 diabetes mellitus with diabetic neuropathy, with long-term current use of insulin    -  Primary     Polyneuropathy associated with underlying disease         Memory loss or impairment         Essential hypertension         CKD (chronic kidney disease) stage 3, GFR 30-59 ml/min         Insulin long-term use                To Do List           Future Appointments        Provider Department Dept Phone    2/3/2017 3:00 PM TALIB Kelsey,ANP-C [facility] Endocrinology [phone]    2017 11:00 AM Shahriar Moore DPM [facility] Podiatry [phone]    2017 11:25 AM LABORATORY, [place] [facility] [phone]    2017 3:00 PM TALIB Kelsey,ANP-C [facility] Endocrinology [phone]      Goals (5 Years of Data)     None       These Medications        Disp Refills Start End    insulin NPH (NOVOLIN N) 100 unit/mL injection 3 vial 12 2/3/2017     75 units of NPH (with 25 units of Regular) before breakfast,  Supper ==24 NPH (  with 15 Regular)    Pharmacy: [facility] [address] Ph #: [phone]       insulin regular 100 unit/mL Inj injection 20 mL 12 2/3/2017     25 of regular (with 75 units of NPH) before breakfast,   15 units of Regular with (25 units of NPH before) supper   75 units of NPH (with 25 units of Regular) before breakfast,  Supper ==25 NPH (  with 15 Regular)    Pharmacy: [facility] [address] Ph #: [phone]         [facility]     [facility] Nurse Care Line -   Assistance  Registered nurses in the [facility] provide clinical advisement, health education, appointment booking, and other advisory services.  Call for this free service at [phone].             Medications           Message regarding Medications     Verify the changes and/or additions to your medication regime listed below are the same as discussed with your clinician today.  If any of these changes or additions are incorrect, please notify your healthcare provider.        START taking these NEW medications        Refills    insulin NPH (NOVOLIN N) 100 unit/mL injection 12    Si units of NPH (with 25 units of Regular) before breakfast,  Supper ==24 NPH (  with 15 Regular)    Class: Print    insulin regular 100 unit/mL Inj injection 12    Si of regular (with 75 units of NPH) before breakfast,   15 units of Regular with (25 units of NPH before) supper   75 units of NPH (with 25 units of Regular) before breakfast,  Supper ==25 NPH (  with 15 Regular)    Class: Print      STOP taking these medications     insulin glargine (LANTUS) 100 unit/mL injection Inject 30 Units into the skin every evening.    insulin NPH-insulin regular, 70/30, (NOVOLIN 70/30) 100 unit/mL (70-30) injection Take 80  units 30 min before breakfast, Take 35 units 30 min before supper           Verify that the below list of medications is an accurate representation of the medications you are currently taking.  If none reported, the list may be blank. If incorrect, please contact your healthcare provider. Carry this list with you in case of emergency.           Current Medications     albuterol (PROVENTIL/VENTOLIN) 90 mcg/Actuation inhaler Inhale 2 puffs into the lungs every 4 (four) hours as needed. Inhale every 4 to 6 hours as needed for wheezing/cough     albuterol 90 mcg/actuation inhaler Inhale 2 puffs into the lungs.    aspirin (ECOTRIN) 81 MG EC tablet Take 81 mg by mouth.    atorvastatin (LIPITOR) 40 MG tablet  "Take 1 tablet (40 mg total) by mouth once daily.    blood glucose control, normal Soln     blood sugar diagnostic Strp TRue result test strips.  Pt on insulin checks TID.    BREEZE 2 TEST STRIPS Strp use as directed twice a day    CALCIUM CARBONATE/VITAMIN D3 (OS-KALEIGH 500 + D ORAL) Three times a day    cholecalciferol, vitamin D3, (VITAMIN D3) 2,000 unit Cap Take by mouth.    fluticasone (FLONASE) 50 mcg/actuation nasal spray INHALE 1 SPRAY INTO EACH NOSTRIL EVERY DAY -APPOINTMENT NEEDED FOR FUTURE FILLS-    fluticasone-vilanterol (BREO) 200-25 mcg/dose DsDv diskus inhaler Inhale 1 puff into the lungs once daily.    hydrALAZINE (APRESOLINE) 25 MG tablet Take 1 tablet (25 mg total) by mouth 2 (two) times daily.    lisinopril-hydrochlorothiazide (PRINZIDE,ZESTORETIC) 20-25 mg Tab Take 1 tablet by mouth once daily.    mometasone (ASMANEX TWISTHALER) 220 mcg (30 doses) inhaler Inhale 2 puffs into the lungs every evening.    pantoprazole (PROTONIX) 40 MG tablet TAKE 1 TABLET (40 MG TOTAL) BY MOUTH ONCE DAILY    TRUEPLUS INSULIN 1/2 mL 31 x 5/16" Syrg USE TWICE A DAY WITH INSULIN    insulin NPH (NOVOLIN N) 100 unit/mL injection 75 units of NPH (with 25 units of Regular) before breakfast,  Supper ==24 NPH (  with 15 Regular)    insulin regular 100 unit/mL Inj injection 25 of regular (with 75 units of NPH) before breakfast,   15 units of Regular with (25 units of NPH before) supper   75 units of NPH (with 25 units of Regular) before breakfast,  Supper ==25 NPH (  with 15 Regular)           Clinical Reference Information           Your Vitals Were     BP Pulse Height Weight BMI    146/74 (BP Location: Right arm, Patient Position: Sitting, BP Method: Manual) 82 5' 1" (1.549 m) 80.2 kg (176 lb 12.9 oz) 33.41 kg/m2      Blood Pressure          Most Recent Value    BP  (!)  146/74      Allergies as of 2/3/2017     No Known Allergies      Immunizations Administered on Date of Encounter - 2/3/2017     None      Orders Placed During " Today's Visit     Future Labs/Procedures Expected by Expires    Hemoglobin A1c  2/3/2017 2/3/2018      [facility] Sign-Up     Activating your [facility] account is as easy as 1-2-3!     1) Visit [email or web address], select Sign Up Now, enter this activation code and your date of birth, then select Next.  [email or web address]  Expires: 2/3/2017  5:53 PM      2) Create a username and password to use when you visit [facility] in the future and select a security question in case you lose your password and select Next.    3) Enter your e-mail address and click Sign Up!    Additional Information  If you have questions, please e-mail [email or web address] or call [phone] to talk to our [facility] staff. Remember, [facility] is NOT to be used for urgent needs. For medical emergencies, dial 911.         Language Assistance Services     ATTENTION: Language assistance services are available, free of charge. Please call [phone].      ATENCIÓN: Si habla johnny, tiene a hooper disposición servicios gratuitos de asistencia lingüística. Llame al [phone].     CHÚ Ý: N?u b?n nói Ti?ng Vi?t, có các d?ch v? h? tr? ngôn ng? mi?n phí dành cho b?n. G?i s? [phone].         [facility] complies with applicable Federal civil rights laws and does not discriminate on the basis of race, color, national origin, age, disability, or sex.

## 2017-02-08 ENCOUNTER — CLINICAL SUPPORT (OUTPATIENT)
Dept: DIABETES | Facility: CLINIC | Age: 72
End: 2017-02-08
Payer: MEDICARE

## 2017-02-08 PROCEDURE — G0108 DIAB MANAGE TRN  PER INDIV: HCPCS | Mod: S$GLB,,, | Performed by: DIETITIAN, REGISTERED

## 2017-02-08 NOTE — PROGRESS NOTES
02/08/17 0000   Diabetes Type   Diabetes Type  Type II   Diabetes History   Diabetes Diagnosis >10 years   Nutrition   Meal Planning (Patient reports he eats all day long.  Wife serves his plate most of time but he does eat out about 2 times monthly)   Monitoring    Monitoring (Has up to date meter)   Self Monitoring  (Yes, patient states they are alternating testing times)   Blood Glucose Logs Yes  (Does not have them today but states they do keep one)   Exercise    Exercise Type (no programmed activity)   Current Diabetes Treatment    Current Treatment Insulin  (Will be converting from 70/30 to NPH and Regular split mix.  Will start with 75 NPH and 25 Regular before breakfast and 24 NPH and 15 Regular before supper)   Social History   Preferred Learning Method Face to Face   Primary Support Self;Spouse   Barriers to Change   Barriers to Change None  (Patient reports wife will be drawing up the insulin for him)   Learning Challenges  None   Readiness to Learn    Readiness to Learn  Acceptance   Cultural Influences   Cultural Influences No   Diabetes Education Visit   Diabetes Education Record Assessment/Progress Post Program/Follow-up   Diabetes Education Assessment/Progress   Acute Complications (preventing, detecting, and treating acute complications) DC  (Discussed symptoms, prevention and treatment of hypoglycemia.  Patient denies any hypoglycemia lately)   Nutrition (Incorporating nutritional management into one's lifestyle) DC  (Briefly discussed his intake and foods to limit and avoid.  Reviewed carb sources and appropriate amounts per meal and snack.  Reviewed plate method to help with portion control.  Stressed importance of not skipping meals but limting snacking)   Medications (states correct name, dose, onset, peak, duration, side effects & timing of meds) DC;D  (Went over specific steps in drawing up both types of insulin in one syringe.  Wrote down steps for breakfast and supper separately.   Reviewed action of insulin as well as rotation, proper injection technique and timing with meals.  He will start new regimen tomorrow and understands that it takes the place of his 70/30 injections)   Diabetes Care Plan/Intervention   Education Plan/Intervention (Patient told to drop off log when they come to the clinic on 2/20.  Will call with adjustments as needed.  Stresed importance of bringing logs for review to be able to adjust insulin.  Phone # provided and encouraged pt to call with any questions/concerns)

## 2017-02-13 ENCOUNTER — OFFICE VISIT (OUTPATIENT)
Dept: FAMILY MEDICINE | Facility: CLINIC | Age: 72
End: 2017-02-13
Payer: MEDICARE

## 2017-02-13 ENCOUNTER — TELEPHONE (OUTPATIENT)
Dept: FAMILY MEDICINE | Facility: CLINIC | Age: 72
End: 2017-02-13

## 2017-02-13 VITALS
HEART RATE: 77 BPM | HEIGHT: 61 IN | BODY MASS INDEX: 33.47 KG/M2 | SYSTOLIC BLOOD PRESSURE: 148 MMHG | TEMPERATURE: 98 F | WEIGHT: 177.25 LBS | DIASTOLIC BLOOD PRESSURE: 60 MMHG

## 2017-02-13 DIAGNOSIS — E11.42 TYPE 2 DIABETES MELLITUS WITH DIABETIC POLYNEUROPATHY, WITH LONG-TERM CURRENT USE OF INSULIN: ICD-10-CM

## 2017-02-13 DIAGNOSIS — N18.30 CKD (CHRONIC KIDNEY DISEASE) STAGE 3, GFR 30-59 ML/MIN: ICD-10-CM

## 2017-02-13 DIAGNOSIS — Z79.4 TYPE 2 DIABETES MELLITUS WITH DIABETIC POLYNEUROPATHY, WITH LONG-TERM CURRENT USE OF INSULIN: ICD-10-CM

## 2017-02-13 DIAGNOSIS — E16.2 HYPOGLYCEMIA: Primary | ICD-10-CM

## 2017-02-13 DIAGNOSIS — G63 POLYNEUROPATHY ASSOCIATED WITH UNDERLYING DISEASE: ICD-10-CM

## 2017-02-13 DIAGNOSIS — Z79.4 INSULIN LONG-TERM USE: ICD-10-CM

## 2017-02-13 PROCEDURE — 3046F HEMOGLOBIN A1C LEVEL >9.0%: CPT | Mod: S$GLB,,, | Performed by: NURSE PRACTITIONER

## 2017-02-13 PROCEDURE — 99499 UNLISTED E&M SERVICE: CPT | Mod: S$GLB,,, | Performed by: NURSE PRACTITIONER

## 2017-02-13 PROCEDURE — 2022F DILAT RTA XM EVC RTNOPTHY: CPT | Mod: S$GLB,,, | Performed by: NURSE PRACTITIONER

## 2017-02-13 PROCEDURE — 4010F ACE/ARB THERAPY RXD/TAKEN: CPT | Mod: S$GLB,,, | Performed by: NURSE PRACTITIONER

## 2017-02-13 PROCEDURE — 1159F MED LIST DOCD IN RCRD: CPT | Mod: S$GLB,,, | Performed by: NURSE PRACTITIONER

## 2017-02-13 PROCEDURE — 99214 OFFICE O/P EST MOD 30 MIN: CPT | Mod: S$GLB,,, | Performed by: NURSE PRACTITIONER

## 2017-02-13 PROCEDURE — 1157F ADVNC CARE PLAN IN RCRD: CPT | Mod: S$GLB,,, | Performed by: NURSE PRACTITIONER

## 2017-02-13 PROCEDURE — 99999 PR PBB SHADOW E&M-EST. PATIENT-LVL IV: CPT | Mod: PBBFAC,,, | Performed by: NURSE PRACTITIONER

## 2017-02-13 PROCEDURE — 1126F AMNT PAIN NOTED NONE PRSNT: CPT | Mod: S$GLB,,, | Performed by: NURSE PRACTITIONER

## 2017-02-13 PROCEDURE — 1160F RVW MEDS BY RX/DR IN RCRD: CPT | Mod: S$GLB,,, | Performed by: NURSE PRACTITIONER

## 2017-02-13 PROCEDURE — 3077F SYST BP >= 140 MM HG: CPT | Mod: S$GLB,,, | Performed by: NURSE PRACTITIONER

## 2017-02-13 PROCEDURE — 3078F DIAST BP <80 MM HG: CPT | Mod: S$GLB,,, | Performed by: NURSE PRACTITIONER

## 2017-02-13 RX ORDER — HUMAN INSULIN 100 [USP'U]/ML
INJECTION, SUSPENSION SUBCUTANEOUS
Refills: 6 | COMMUNITY
Start: 2016-12-14 | End: 2017-06-08 | Stop reason: SDUPTHER

## 2017-02-13 RX ORDER — FLUTICASONE PROPIONATE 100 UG/1
POWDER, METERED RESPIRATORY (INHALATION)
Refills: 3 | COMMUNITY
Start: 2017-02-08

## 2017-02-13 NOTE — TELEPHONE ENCOUNTER
Care Everywhere updated to show most recent visit at [place] ED. Pt went to Ed for hypoglycemia. Will contact Endocrine for sooner appointment.

## 2017-02-13 NOTE — TELEPHONE ENCOUNTER
Can you call him about his ER visit - I see [facility] in there but no clinical from 2-11-17 - what did he go for? He needs a closer appt with [facility] also - Not may 2017 -   Please see about his er visit

## 2017-02-13 NOTE — PROGRESS NOTES
Subjective:       Patient ID: Chema Ambrose is a 71 y.o. male.    Chief Complaint: Hypoglycemia (patient c/o of low sugar. Went to ER on saturday because sugar went down. Sugar this AM was around 130 before breakfast)    HPI Comments: Here today to follow up     100 units  This past Friday - lunch time sugar was 200s, was 119 before supper - ate supper - didn't take his shot for supper - because his sugar was 119 - so wife didn't give shot with dinner   At bedtime sugar was 79 - had spaghetti and bread for dinner and ate apple, banana - so she skipped evening insulin   At 0200- calling his wife and he was on floor on bedroom - both legs didn't want to work  And couldn't make arms work either - got him on bed and checked sugar 40 - can OJ drank it - it was awake and talking   Then he went back to sleep - got up at 0500 Saturday am- wife heard him moaning -foaming and drooling - wasn't able to talk - moaning - not being able to talk - called her daughter- she tried to get him to drink OJ but he was not able to drink and they called on call nurse and told him to go to hospital   Wife called 911 -  this episode lasted  20-30 minutes and EMS came -checked sugar 30s - gave him glucagon - he doesn't remember anything that  Happened -   First thing that he remembers is getting to the hospital. - let him go home 1100 - told him that it was hypoglycemia         2/3/17 -dosage  Changed to 75 units of NPH (with 25 units of Regular) before breakfast,  Supper ==24 NPH (  with 15 Regular)  Sat night 100s  SUnday am - 100s  This am - 130 - now wife says at supper and after 100s - he slept late today and he didn't take his insulin  Because he didn't eat breakfast   Doc at hospital started a sliding scale insulin - evening insulin - this is new for him     Vitals:    02/13/17 1056   BP: (!) 148/60   Pulse: 77   Temp: 98.2 °F (36.8 °C)     Review of Systems   Constitutional: Negative.  Negative for fatigue and fever.   HENT:  Negative.    Eyes: Negative.    Respiratory: Negative.  Negative for cough and shortness of breath.    Cardiovascular: Negative.  Negative for chest pain.   Gastrointestinal: Negative.  Negative for abdominal pain, diarrhea and nausea.   Endocrine: Negative.    Genitourinary: Negative.  Negative for dysuria and hematuria.   Musculoskeletal: Negative.    Skin: Negative.  Negative for color change and rash.   Allergic/Immunologic: Negative.    Neurological: Negative.  Negative for dizziness, syncope, speech difficulty and numbness.   Hematological: Negative.    Psychiatric/Behavioral: Negative.        Past Medical History   Diagnosis Date    Asthma      controlled    Cataract      ou done//    Chronic renal disease, stage 3, moderately decreased glomerular filtration rate between 30-59 mL/min/1.73 square meter     DM type 2 (diabetes mellitus, type 2)      type II    GERD (gastroesophageal reflux disease)     HTN (hypertension)     Hyperlipidemia     Kidney stone 2003     passed one stone, [place]    Low serum testosterone level     Murmur, cardiac      mild aortic and tricuspid regurgitation    Obesity     LISSETH (obstructive sleep apnea)      does not wear CPAP    Osteopenia     Pancreatitis 12/26/13     was in hospital, resolving    Seasonal allergies     Urinary tract infection      Objective:      Physical Exam   Constitutional: He is oriented to person, place, and time. He appears well-developed and well-nourished.   HENT:   Head: Normocephalic and atraumatic.   Mouth/Throat: Oropharynx is clear and moist.   Eyes: Conjunctivae and EOM are normal. Pupils are equal, round, and reactive to light.   Neck: Normal range of motion. Neck supple.   Cardiovascular: Normal rate, regular rhythm, normal heart sounds and intact distal pulses.  Exam reveals no friction rub.    No murmur heard.  Pulmonary/Chest: Effort normal and breath sounds normal. No respiratory distress. He has no wheezes. He has no rales.    Abdominal: Soft. Bowel sounds are normal.   Musculoskeletal: Normal range of motion.   Neurological: He is alert and oriented to person, place, and time. No cranial nerve deficit. Coordination normal.   Skin: Skin is warm and dry.   Psychiatric: He has a normal mood and affect. His behavior is normal. Judgment and thought content normal.   Nursing note and vitals reviewed.      Assessment:       1. Hypoglycemia    2. Type 2 diabetes mellitus with diabetic polyneuropathy, with long-term current use of insulin    3. CKD (chronic kidney disease) stage 3, GFR 30-59 ml/min    4. Polyneuropathy associated with underlying disease    5. Insulin long-term use        Plan:       Hypoglycemia  Episode documented  Decrease to 49 units in am 37 and 13 Regular - and stay on normal dose    Type 2 diabetes mellitus with diabetic polyneuropathy, with long-term current use of insulin  On meds followed with Endo    CKD (chronic kidney disease) stage 3, GFR 30-59 ml/min  Stable on Ace   Polyneuropathy associated with underlying disease    Insulin long-term use    Please stay on same dose which is half in am (49 units _) in am and stay on same dose in evening.   Fu with Endocrine in 1 week for BS log on new insulin dosage

## 2017-03-09 ENCOUNTER — TELEPHONE (OUTPATIENT)
Dept: ADMINISTRATIVE | Facility: HOSPITAL | Age: 72
End: 2017-03-09

## 2017-03-09 RX ORDER — ATORVASTATIN CALCIUM 40 MG/1
40 TABLET, FILM COATED ORAL DAILY
Qty: 90 TABLET | Refills: 1 | Status: SHIPPED | OUTPATIENT
Start: 2017-03-09 | End: 2017-06-06 | Stop reason: SDUPTHER

## 2017-04-05 ENCOUNTER — OFFICE VISIT (OUTPATIENT)
Dept: PODIATRY | Facility: CLINIC | Age: 72
End: 2017-04-05
Payer: MEDICARE

## 2017-04-05 VITALS — HEIGHT: 61 IN | WEIGHT: 174.38 LBS | BODY MASS INDEX: 32.92 KG/M2

## 2017-04-05 DIAGNOSIS — E11.49 TYPE II DIABETES MELLITUS WITH NEUROLOGICAL MANIFESTATIONS: Primary | ICD-10-CM

## 2017-04-05 DIAGNOSIS — B35.1 ONYCHOMYCOSIS DUE TO DERMATOPHYTE: ICD-10-CM

## 2017-04-05 PROCEDURE — 1126F AMNT PAIN NOTED NONE PRSNT: CPT | Mod: S$GLB,,, | Performed by: PODIATRIST

## 2017-04-05 PROCEDURE — 99499 UNLISTED E&M SERVICE: CPT | Mod: S$GLB,,, | Performed by: PODIATRIST

## 2017-04-05 PROCEDURE — 1157F ADVNC CARE PLAN IN RCRD: CPT | Mod: S$GLB,,, | Performed by: PODIATRIST

## 2017-04-05 PROCEDURE — 99214 OFFICE O/P EST MOD 30 MIN: CPT | Mod: 25,S$GLB,, | Performed by: PODIATRIST

## 2017-04-05 PROCEDURE — 4010F ACE/ARB THERAPY RXD/TAKEN: CPT | Mod: S$GLB,,, | Performed by: PODIATRIST

## 2017-04-05 PROCEDURE — 99999 PR PBB SHADOW E&M-EST. PATIENT-LVL II: CPT | Mod: PBBFAC,,, | Performed by: PODIATRIST

## 2017-04-05 PROCEDURE — 1159F MED LIST DOCD IN RCRD: CPT | Mod: S$GLB,,, | Performed by: PODIATRIST

## 2017-04-05 PROCEDURE — 11721 DEBRIDE NAIL 6 OR MORE: CPT | Mod: Q9,S$GLB,, | Performed by: PODIATRIST

## 2017-04-05 PROCEDURE — 3046F HEMOGLOBIN A1C LEVEL >9.0%: CPT | Mod: S$GLB,,, | Performed by: PODIATRIST

## 2017-04-05 PROCEDURE — 1160F RVW MEDS BY RX/DR IN RCRD: CPT | Mod: S$GLB,,, | Performed by: PODIATRIST

## 2017-04-05 NOTE — PROGRESS NOTES
Subjective:      Patient ID: Chema Ambrose is a 71 y.o. male.    Chief Complaint: Diabetic Foot Exam and Nail Care (A1C 11.6   12/15/16  Radha 2/13/17)    Chema is a 71 y.o. male who presents to the clinic for routine evaluation and treatment of diabetic feet. Chema has a past medical history of Asthma; Cataract; Chronic renal disease, stage 3, moderately decreased glomerular filtration rate between 30-59 mL/min/1.73 square meter; DM type 2 (diabetes mellitus, type 2); GERD (gastroesophageal reflux disease); HTN (hypertension); Hyperlipidemia; Kidney stone (2003); Low serum testosterone level; Murmur, cardiac; Obesity; LISSETH (obstructive sleep apnea); Osteopenia; Pancreatitis (12/26/13); Seasonal allergies; and Urinary tract infection. Patient relates no major problem with feet. Only complaints today consist of toenails in need of trimming.  Denies experiencing pain from toenails with wearing closed toe shoes.  Has not attempted to self treat.  Denies any additional pedal complaints.     PCP: Марина Garcia MD    Date Last Seen by PCP: 2/13/17    Current shoe gear: Rx diabetic shoes.    Hemoglobin A1C   Date Value Ref Range Status   12/15/2016 11.6 (H) 0.0 - 5.6 % Final     Comment:     Reference Interval:  5.0 - 5.6 Normal   5.7 - 6.4 High Risk   > 6.5 Diabetic    Hgb A1c results are standardized based on the (NGSP) National   Glycohemoglobin Standardization Program.    Hemoglobin A1C levels are related to mean serum/plasma glucose   during the preceding 2-3 months.        12/15/2016 11.2 (H) 4.5 - 6.2 % Final     Comment:     According to ADA guidelines, hemoglobin A1C <7.0% represents  optimal control in non-pregnant diabetic patients.  Different  metrics may apply to specific populations.   Standards of Medical Care in Diabetes - 2016.  For the purpose of screening for the presence of diabetes:  <5.7%     Consistent with the absence of diabetes  5.7-6.4%  Consistent with increasing risk for diabetes    (prediabetes)  >or=6.5%  Consistent with diabetes  Currently no consensus exists for use of hemoglobin A1C  for diagnosis of diabetes for children.     10/05/2016 9.5 (H) 4.5 - 6.2 % Final     Comment:     According to ADA guidelines, hemoglobin A1C <7.0% represents  optimal control in non-pregnant diabetic patients.  Different  metrics may apply to specific populations.   Standards of Medical Care in Diabetes - 2016.  For the purpose of screening for the presence of diabetes:  <5.7%     Consistent with the absence of diabetes  5.7-6.4%  Consistent with increasing risk for diabetes   (prediabetes)  >or=6.5%  Consistent with diabetes  Currently no consensus exists for use of hemoglobin A1C  for diagnosis of diabetes for children.             Past Medical History:   Diagnosis Date    Asthma     controlled    Cataract     ou done//    Chronic renal disease, stage 3, moderately decreased glomerular filtration rate between 30-59 mL/min/1.73 square meter     DM type 2 (diabetes mellitus, type 2)     type II    GERD (gastroesophageal reflux disease)     HTN (hypertension)     Hyperlipidemia     Kidney stone 2003    passed one stone, [place]    Low serum testosterone level     Murmur, cardiac     mild aortic and tricuspid regurgitation    Obesity     LISSETH (obstructive sleep apnea)     does not wear CPAP    Osteopenia     Pancreatitis 12/26/13    was in hospital, resolving    Seasonal allergies     Urinary tract infection        Past Surgical History:   Procedure Laterality Date    CAROTID ENDARTERECTOMY      right 2011, left Nov 2013    CATARACT EXTRACTION      od d 6-21-12//os d 7-19-12//    CHOLECYSTECTOMY      [facility] [place]    COLONOSCOPY N/A 7/28/2016    Procedure: COLONOSCOPY;  Surgeon: Prince Dodge Jr., MD;  [facility];  Service: Endoscopy;  Laterality: N/A;    COLONOSCOPY W/ POLYPECTOMY  07/28/2011    AMAYA.   One 1 mm polyp in the cecum.  TUBULAR  ADENOMA.  One 1 mm in the descending colon.  TUBULAR ADENOMA.  Small internal hemorrhoids.    EYE SURGERY      VASCULAR SURGERY  10/13/12    Carotid Endarterectomy-right    WISDOM TOOTH EXTRACTION         Family History   Problem Relation Age of Onset    Diabetes Father     Amblyopia Neg Hx     Blindness Neg Hx     Cataracts Neg Hx     Cancer Neg Hx     Glaucoma Neg Hx     Hypertension Neg Hx     Macular degeneration Neg Hx     Retinal detachment Neg Hx     Strabismus Neg Hx     Stroke Neg Hx     Thyroid disease Neg Hx        Social History     Social History    Marital status:      Spouse name: N/A    Number of children: N/A    Years of education: N/A     Social History Main Topics    Smoking status: Former Smoker     Packs/day: 1.00     Years: 10.00     Quit date: 7/19/1970    Smokeless tobacco: Former User    Alcohol use No    Drug use: No    Sexual activity: Not Asked     Other Topics Concern    None     Social History Narrative       Current Outpatient Prescriptions   Medication Sig Dispense Refill    albuterol (PROVENTIL/VENTOLIN) 90 mcg/Actuation inhaler Inhale 2 puffs into the lungs every 4 (four) hours as needed. Inhale every 4 to 6 hours as needed for wheezing/cough       albuterol 90 mcg/actuation inhaler Inhale 2 puffs into the lungs.      aspirin (ECOTRIN) 81 MG EC tablet Take 81 mg by mouth.      atorvastatin (LIPITOR) 40 MG tablet Take 1 tablet (40 mg total) by mouth once daily. 90 tablet 1    blood glucose control, normal Soln   12    blood sugar diagnostic Strp TRue result test strips.  Pt on insulin checks TID. 100 strip 12    BREEZE 2 TEST STRIPS Strp use as directed twice a day 100 each 12    CALCIUM CARBONATE/VITAMIN D3 (OS-KALEIGH 500 + D ORAL) Three times a day      cholecalciferol, vitamin D3, (VITAMIN D3) 2,000 unit Cap Take by mouth.      FLOVENT DISKUS 100 mcg/actuation inhaler   3    fluticasone (FLONASE) 50 mcg/actuation nasal spray INHALE 1 SPRAY  "INTO EACH NOSTRIL EVERY DAY -APPOINTMENT NEEDED FOR FUTURE FILLS- 16 g 1    fluticasone-vilanterol (BREO) 200-25 mcg/dose DsDv diskus inhaler Inhale 1 puff into the lungs once daily. 360 each 0    hydrALAZINE (APRESOLINE) 25 MG tablet Take 1 tablet (25 mg total) by mouth 2 (two) times daily. 60 tablet 2    insulin NPH (NOVOLIN N) 100 unit/mL injection 75 units of NPH (with 25 units of Regular) before breakfast,  Supper ==24 NPH (  with 15 Regular) 3 vial 12    insulin regular 100 unit/mL Inj injection 25 of regular (with 75 units of NPH) before breakfast,   15 units of Regular with (25 units of NPH before) supper   75 units of NPH (with 25 units of Regular) before breakfast,  Supper ==25 NPH (  with 15 Regular) 20 mL 12    lisinopril-hydrochlorothiazide (PRINZIDE,ZESTORETIC) 20-25 mg Tab Take 1 tablet by mouth once daily. 90 tablet 3    mometasone (ASMANEX TWISTHALER) 220 mcg (30 doses) inhaler Inhale 2 puffs into the lungs every evening.      NOVOLIN 70/30 100 unit/mL (70-30) injection   6    pantoprazole (PROTONIX) 40 MG tablet TAKE 1 TABLET (40 MG TOTAL) BY MOUTH ONCE DAILY 30 tablet 5    TRUEPLUS INSULIN 1/2 mL 31 x 5/16" Syrg USE TWICE A DAY WITH INSULIN 100 each PRN     No current facility-administered medications for this visit.        Review of patient's allergies indicates:   Allergen Reactions    No known allergies          Review of Systems   Constitution: Negative for chills, decreased appetite, diaphoresis and fever.   Cardiovascular: Positive for leg swelling. Negative for claudication.   Skin: Positive for color change, dry skin and nail changes.   Musculoskeletal: Positive for arthritis. Negative for back pain, falls, gout and joint pain.   Neurological: Positive for numbness. Negative for paresthesias.   Psychiatric/Behavioral: Negative for altered mental status.           Objective:      Physical Exam   Constitutional: He is oriented to person, place, and time. He appears well-developed " and well-nourished. No distress.   Cardiovascular:   Pulses:       Dorsalis pedis pulses are 2+ on the right side, and 2+ on the left side.        Posterior tibial pulses are 1+ on the right side, and 1+ on the left side.   CFT <3 seconds bilateral.  Pedal hair growth decreased bilateral.   No varicosities noted bilateral. Mild nonpitting edema noted to bilateral lower extremity.  Toes are cool to touch with increasing warmness proximal.       Musculoskeletal: Normal range of motion. He exhibits edema. He exhibits no tenderness.   Muscle strength 5/5 in all muscle groups bilateral.  No tenderness nor crepitation with ROM of foot/ankle joints bilateral.  No tenderness with palpation of bilateral foot and ankle.  Bilateral rectus foot type.       Neurological: He is alert and oriented to person, place, and time. He has normal strength. A sensory deficit is present.   Protective sensation per [place] monofilament intact bilateral.    Vibratory sensation decreased bilateral.    Light touch intact bilateral.   Skin: Skin is warm, dry and intact. No abrasion, no bruising, no burn, no ecchymosis, no laceration, no lesion, no petechiae and no rash noted. He is not diaphoretic. No cyanosis or erythema. No pallor. Nails show no clubbing.   Xerosis of bilateral foot and ankle.  Pedal skin appears thin and atrophic bilateral.  Toenails x 10 appear thickened by 2 mm's, elongated by 2 mm's, and discolored with subungual debris.  No open wounds, interdigital maceration, or hyperkeratoses noted bilateral.               Assessment:       Encounter Diagnoses   Name Primary?    Type II diabetes mellitus with neurological manifestations Yes    Onychomycosis due to dermatophyte          Plan:       Chema was seen today for diabetic foot exam and nail care.    Diagnoses and all orders for this visit:    Type II diabetes mellitus with neurological manifestations    Onychomycosis due to dermatophyte      I counseled the  patient on his conditions, their implications and medical management.    Shoe inspection. Diabetic Foot Education. Patient reminded of the importance of good nutrition and blood sugar control to help prevent podiatric complications of diabetes. Patient instructed on proper foot hygeine. We discussed wearing proper shoe gear, daily foot inspections, never walking without protective shoe gear, never putting sharp instruments to feet    With patient's permission, nails were aggressively reduced and debrided x 10 to their soft tissue attachment mechanically and with electric , removing all offending nail and debris. Patient relates relief following the procedure. He will continue to monitor the areas daily, inspect his feet, wear protective shoe gear when ambulatory, moisturizer to maintain skin integrity and follow in this office in approximately 2-3 months, sooner p.r.n.      Return in about 3 months (around 7/5/2017).    Shahriar Moore DPM

## 2017-04-05 NOTE — MR AVS SNAPSHOT
[facility]  [address] [facility] [address]  [facility] [address]  Phone: [phone]                  Chema Ambrose   2017 1:00 PM   Office Visit    Description:  Male : 1945   Provider:  Shahriar Moore DPM   Department:  [facility]           Reason for Visit     Diabetic Foot Exam     Nail Care                To Do List           Future Appointments        Provider Department Dept Phone    2017 2:20 PM Jelani Grajeda MD [place] [facility] [phone]    2017 11:25 AM LABORATORY, [place] [facility] [phone]    2017 3:00 PM Homa Urbina APRN,ANP-C [facility] Endocrinology [phone]    2017 5:20 PM Марина Garcia MD [facility] Internal Medicine [phone]    2017 11:20 AM Shahriar Moore DPM [facility] [phone]      Goals (5 Years of Data)     None      [facility] On Call     [facility] Nurse Care Line -  Assistance  Unless otherwise directed by your provider, please contact [facility], our nurse care line that is available for  assistance.     Registered nurses in the [facility] provide: appointment scheduling, clinical advisement, health education, and other advisory services.  Call: [phone] (toll free)               Medications           Message regarding Medications     Verify the changes and/or additions to your medication regime listed below are the same as discussed with your clinician today.  If any of these changes or additions are incorrect, please notify your healthcare provider.             Verify that the below list of medications is an accurate representation of the medications you are currently taking.  If none reported, the list may be blank. If incorrect, please contact your healthcare provider. Carry this list with you in case of emergency.           Current Medications     albuterol (PROVENTIL/VENTOLIN) 90 mcg/Actuation inhaler Inhale 2 puffs into  "the lungs every 4 (four) hours as needed. Inhale every 4 to 6 hours as needed for wheezing/cough     albuterol 90 mcg/actuation inhaler Inhale 2 puffs into the lungs.    aspirin (ECOTRIN) 81 MG EC tablet Take 81 mg by mouth.    atorvastatin (LIPITOR) 40 MG tablet Take 1 tablet (40 mg total) by mouth once daily.    blood glucose control, normal Soln     blood sugar diagnostic Strp TRue result test strips.  Pt on insulin checks TID.    BREEZE 2 TEST STRIPS Strp use as directed twice a day    CALCIUM CARBONATE/VITAMIN D3 (OS-KALEIGH 500 + D ORAL) Three times a day    cholecalciferol, vitamin D3, (VITAMIN D3) 2,000 unit Cap Take by mouth.    FLOVENT DISKUS 100 mcg/actuation inhaler     fluticasone (FLONASE) 50 mcg/actuation nasal spray INHALE 1 SPRAY INTO EACH NOSTRIL EVERY DAY -APPOINTMENT NEEDED FOR FUTURE FILLS-    fluticasone-vilanterol (BREO) 200-25 mcg/dose DsDv diskus inhaler Inhale 1 puff into the lungs once daily.    hydrALAZINE (APRESOLINE) 25 MG tablet Take 1 tablet (25 mg total) by mouth 2 (two) times daily.    insulin NPH (NOVOLIN N) 100 unit/mL injection 75 units of NPH (with 25 units of Regular) before breakfast,  Supper ==24 NPH (  with 15 Regular)    insulin regular 100 unit/mL Inj injection 25 of regular (with 75 units of NPH) before breakfast,   15 units of Regular with (25 units of NPH before) supper   75 units of NPH (with 25 units of Regular) before breakfast,  Supper ==25 NPH (  with 15 Regular)    lisinopril-hydrochlorothiazide (PRINZIDE,ZESTORETIC) 20-25 mg Tab Take 1 tablet by mouth once daily.    mometasone (ASMANEX TWISTHALER) 220 mcg (30 doses) inhaler Inhale 2 puffs into the lungs every evening.    NOVOLIN 70/30 100 unit/mL (70-30) injection     pantoprazole (PROTONIX) 40 MG tablet TAKE 1 TABLET (40 MG TOTAL) BY MOUTH ONCE DAILY    TRUEPLUS INSULIN 1/2 mL 31 x 5/16" Syrg USE TWICE A DAY WITH INSULIN           Clinical Reference Information           Your Vitals Were     Height Weight BMI       " "   5' 1" (1.549 m) 79.1 kg (174 lb 6.1 oz) 32.95 kg/m2        Allergies as of 4/5/2017     No Known Allergies      Immunizations Administered on Date of Encounter - 4/5/2017     None      [facility] Sign-Up     Activating your [facility] account is as easy as 1-2-3!     1) Visit [email or web address], select Sign Up Now, enter this activation code and your date of birth, then select Next.  [email or web address]  Expires: 5/20/2017  1:22 PM      2) Create a username and password to use when you visit [facility] in the future and select a security question in case you lose your password and select Next.    3) Enter your e-mail address and click Sign Up!    Additional Information  If you have questions, please e-mail [email or web address] or call [phone] to talk to our [facility] staff. Remember, [facility] is NOT to be used for urgent needs. For medical emergencies, dial 911.         Language Assistance Services     ATTENTION: Language assistance services are available, free of charge. Please call [phone].      ATENCIÓN: Si habla español, tiene a hooper disposición servicios gratuitos de asistencia lingüística. Llame al [phone].     CHÚ Ý: N?u b?n nói Ti?ng Vi?t, có các d?ch v? h? tr? ngôn ng? mi?n phí dành cho b?n. G?i s? [phone].         [facility] complies with applicable Federal civil rights laws and does not discriminate on the basis of race, color, national origin, age, disability, or sex.        "

## 2017-04-12 RX ORDER — HYDRALAZINE HYDROCHLORIDE 25 MG/1
TABLET, FILM COATED ORAL
Qty: 60 TABLET | Refills: 2 | Status: SHIPPED | OUTPATIENT
Start: 2017-04-12 | End: 2017-06-06 | Stop reason: SDUPTHER

## 2017-04-28 ENCOUNTER — LAB VISIT (OUTPATIENT)
Dept: LAB | Facility: HOSPITAL | Age: 72
End: 2017-04-28
Attending: NURSE PRACTITIONER
Payer: MEDICARE

## 2017-04-28 DIAGNOSIS — Z79.4 TYPE 2 DIABETES MELLITUS WITH DIABETIC NEUROPATHY, WITH LONG-TERM CURRENT USE OF INSULIN: ICD-10-CM

## 2017-04-28 DIAGNOSIS — E11.40 TYPE 2 DIABETES MELLITUS WITH DIABETIC NEUROPATHY, WITH LONG-TERM CURRENT USE OF INSULIN: ICD-10-CM

## 2017-04-28 PROCEDURE — 83036 HEMOGLOBIN GLYCOSYLATED A1C: CPT

## 2017-04-28 PROCEDURE — 36415 COLL VENOUS BLD VENIPUNCTURE: CPT | Mod: PO

## 2017-04-29 LAB
ESTIMATED AVG GLUCOSE: 209 MG/DL
HBA1C MFR BLD HPLC: 8.9 %

## 2017-05-02 ENCOUNTER — OFFICE VISIT (OUTPATIENT)
Dept: CARDIOLOGY | Facility: CLINIC | Age: 72
End: 2017-05-02
Payer: MEDICARE

## 2017-05-02 DIAGNOSIS — I77.9 BILATERAL CAROTID ARTERY DISEASE: ICD-10-CM

## 2017-05-02 DIAGNOSIS — I35.0 AORTIC STENOSIS, MILD: ICD-10-CM

## 2017-05-02 DIAGNOSIS — Z79.4 TYPE 2 DIABETES MELLITUS WITH DIABETIC NEUROPATHY, WITH LONG-TERM CURRENT USE OF INSULIN: ICD-10-CM

## 2017-05-02 DIAGNOSIS — R00.1 SINUS BRADYCARDIA: ICD-10-CM

## 2017-05-02 DIAGNOSIS — G47.33 OSA (OBSTRUCTIVE SLEEP APNEA): ICD-10-CM

## 2017-05-02 DIAGNOSIS — I10 ESSENTIAL HYPERTENSION: Primary | ICD-10-CM

## 2017-05-02 DIAGNOSIS — E11.40 TYPE 2 DIABETES MELLITUS WITH DIABETIC NEUROPATHY, WITH LONG-TERM CURRENT USE OF INSULIN: ICD-10-CM

## 2017-05-02 DIAGNOSIS — E78.5 HYPERLIPIDEMIA, UNSPECIFIED HYPERLIPIDEMIA TYPE: ICD-10-CM

## 2017-05-02 PROCEDURE — 99499 UNLISTED E&M SERVICE: CPT | Mod: S$GLB,,, | Performed by: INTERNAL MEDICINE

## 2017-05-02 PROCEDURE — 4010F ACE/ARB THERAPY RXD/TAKEN: CPT | Mod: S$GLB,,, | Performed by: INTERNAL MEDICINE

## 2017-05-02 PROCEDURE — 99999 PR PBB SHADOW E&M-EST. PATIENT-LVL II: CPT | Mod: PBBFAC,,, | Performed by: INTERNAL MEDICINE

## 2017-05-02 PROCEDURE — 1160F RVW MEDS BY RX/DR IN RCRD: CPT | Mod: S$GLB,,, | Performed by: INTERNAL MEDICINE

## 2017-05-02 PROCEDURE — 1159F MED LIST DOCD IN RCRD: CPT | Mod: S$GLB,,, | Performed by: INTERNAL MEDICINE

## 2017-05-02 PROCEDURE — 3045F PR MOST RECENT HEMOGLOBIN A1C LEVEL 7.0-9.0%: CPT | Mod: S$GLB,,, | Performed by: INTERNAL MEDICINE

## 2017-05-02 PROCEDURE — 99214 OFFICE O/P EST MOD 30 MIN: CPT | Mod: S$GLB,,, | Performed by: INTERNAL MEDICINE

## 2017-05-02 NOTE — MR AVS SNAPSHOT
[facility]  [address]  [facility] [address]  Phone: [phone]                  Chema Ambrose   2017 9:40 AM   Office Visit    Description:  Male : 1945   Provider:  Jelani Grajeda MD   Department:  [facility]           Reason for Visit     Carotid Artery Disease     Hypertension     Hyperlipidemia           Diagnoses this Visit        Comments    Essential hypertension    -  Primary     Bilateral carotid artery disease         Sinus bradycardia         Hyperlipidemia, unspecified hyperlipidemia type         Type 2 diabetes mellitus with diabetic neuropathy, with long-term current use of insulin         LISSETH (obstructive sleep apnea)         Aortic stenosis, mild                To Do List           Future Appointments        Provider Department Dept Phone    2017 9:40 AM Jelani Grajeda MD [facility] Card[facility] [phone]    2017 3:00 PM TALIB Kelsey,ANP-C [facility] Endocrinology [phone]    2017 5:20 PM Марина Garcia MD [facility] Internal Medicine [phone]    2017 11:20 AM Shahriar Moore DPM [facility] [phone]      Goals (5 Years of Data)     None      Follow-Up and Disposition     Return in about 6 months (around 2017).      [facility]     [facility] Nurse Care Line -  Assistance  Unless otherwise directed by your provider, please contact [facility], our nurse care line that is available for  assistance.     Registered nurses in the [facility] provide: appointment scheduling, clinical advisement, health education, and other advisory services.  Call: [phone] (toll free)               Medications           Message regarding Medications     Verify the changes and/or additions to your medication regime listed below are the same as discussed with your clinician today.  If any of these changes or additions are incorrect, please notify your healthcare  provider.             Verify that the below list of medications is an accurate representation of the medications you are currently taking.  If none reported, the list may be blank. If incorrect, please contact your healthcare provider. Carry this list with you in case of emergency.           Current Medications     albuterol (PROVENTIL/VENTOLIN) 90 mcg/Actuation inhaler Inhale 2 puffs into the lungs every 4 (four) hours as needed. Inhale every 4 to 6 hours as needed for wheezing/cough     albuterol 90 mcg/actuation inhaler Inhale 2 puffs into the lungs.    aspirin (ECOTRIN) 81 MG EC tablet Take 81 mg by mouth.    atorvastatin (LIPITOR) 40 MG tablet Take 1 tablet (40 mg total) by mouth once daily.    blood glucose control, normal Soln     blood sugar diagnostic Strp TRue result test strips.  Pt on insulin checks TID.    BREEZE 2 TEST STRIPS Strp use as directed twice a day    CALCIUM CARBONATE/VITAMIN D3 (OS-KALEIGH 500 + D ORAL) Three times a day    cholecalciferol, vitamin D3, (VITAMIN D3) 2,000 unit Cap Take by mouth.    FLOVENT DISKUS 100 mcg/actuation inhaler     fluticasone (FLONASE) 50 mcg/actuation nasal spray INHALE 1 SPRAY INTO EACH NOSTRIL EVERY DAY -APPOINTMENT NEEDED FOR FUTURE FILLS-    fluticasone-vilanterol (BREO) 200-25 mcg/dose DsDv diskus inhaler Inhale 1 puff into the lungs once daily.    hydrALAZINE (APRESOLINE) 25 MG tablet TAKE 1 TABLET (25 MG TOTAL) BY MOUTH TWICE A DAY    insulin NPH (NOVOLIN N) 100 unit/mL injection 75 units of NPH (with 25 units of Regular) before breakfast,  Supper ==24 NPH (  with 15 Regular)    insulin regular 100 unit/mL Inj injection 25 of regular (with 75 units of NPH) before breakfast,   15 units of Regular with (25 units of NPH before) supper   75 units of NPH (with 25 units of Regular) before breakfast,  Supper ==25 NPH (  with 15 Regular)    lisinopril-hydrochlorothiazide (PRINZIDE,ZESTORETIC) 20-25 mg Tab Take 1 tablet by mouth once daily.    mometasone (ASMANEX  "TWISTHALER) 220 mcg (30 doses) inhaler Inhale 2 puffs into the lungs every evening.    pantoprazole (PROTONIX) 40 MG tablet TAKE 1 TABLET (40 MG TOTAL) BY MOUTH ONCE DAILY    TRUEPLUS INSULIN 1/2 mL 31 x 5/16" Syrg USE TWICE A DAY WITH INSULIN    NOVOLIN 70/30 100 unit/mL (70-30) injection            Clinical Reference Information           Allergies as of 5/2/2017     No Known Allergies      Immunizations Administered on Date of Encounter - 5/2/2017     None      [facility] Sign-Up     Activating your [facility] account is as easy as 1-2-3!     1) Visit [email or web address], select Sign Up Now, enter this activation code and your date of birth, then select Next.  [email or web address]  Expires: 5/20/2017  1:22 PM      2) Create a username and password to use when you visit [facility] in the future and select a security question in case you lose your password and select Next.    3) Enter your e-mail address and click Sign Up!    Additional Information  If you have questions, please e-mail [email or web address] or call [phone] to talk to our [facility] staff. Remember, [facility] is NOT to be used for urgent needs. For medical emergencies, dial 911.         Language Assistance Services     ATTENTION: Language assistance services are available, free of charge. Please call [phone].      ATENCIÓN: Si habla español, tiene a hooper disposición servicios gratuitos de asistencia lingüística. Llame al [phone].     [facility] Ý: N?u b?n nói Ti?ng Vi?t, có các d?ch v? h? tr? ngôn ng? mi?n phí dành cho b?n. G?i s? [phone].         [facility] complies with applicable Federal civil rights laws and does not discriminate on the basis of race, color, national origin, age, disability, or sex.        "

## 2017-05-02 NOTE — PROGRESS NOTES
Subjective:    Patient ID:  Chema Ambrose is a 71 y.o. male who presents for follow-up of Carotid Artery Disease (f/u appt); Hypertension; and Hyperlipidemia      HPI Comments: Pt here for f/u. Last visit we increased his lisinopril-HCT and he reports BP has been doing well. Other than feeling sleepy he has no complaints. He denies any palpitations, dizziness, syncope or pre-syncope. He denies any chest pain, SOB, PND, orthopnea. He denies claudication, lower extremity edema.      Review of Systems   Constitution: Positive for malaise/fatigue. Negative for weight gain and weight loss.   HENT: Negative.    Eyes: Negative.    Cardiovascular: Negative for chest pain, claudication, cyanosis, dyspnea on exertion, irregular heartbeat, leg swelling, near-syncope, orthopnea (no PND), palpitations and syncope.   Respiratory: Negative for cough, hemoptysis, shortness of breath and snoring.    Endocrine: Negative.    Skin: Negative.    Musculoskeletal: Negative for joint pain, muscle cramps, muscle weakness and myalgias.   Gastrointestinal: Negative for diarrhea, hematemesis, nausea and vomiting.   Genitourinary: Negative.    Neurological: Negative for dizziness, focal weakness, light-headedness, loss of balance, numbness, paresthesias and seizures.   Psychiatric/Behavioral: Negative.         Objective:    Physical Exam   Constitutional: He is oriented to person, place, and time. He appears well-developed and well-nourished.   HENT:   Mouth/Throat: Oropharynx is clear and moist.   Eyes: Pupils are equal, round, and reactive to light.   Neck: Normal range of motion. No thyromegaly present.   Cardiovascular: Normal rate, regular rhythm, S1 normal, S2 normal, intact distal pulses and normal pulses.   No extrasystoles are present. PMI is not displaced.  Exam reveals no friction rub.    Murmur heard.   Harsh midsystolic murmur is present with a grade of 2/6  at the upper right sternal border radiating to the  neck  Pulmonary/Chest: Effort normal and breath sounds normal. He has no wheezes. He has no rales. He exhibits no tenderness.   Abdominal: Soft. Bowel sounds are normal. He exhibits no distension and no mass. There is no tenderness.   Musculoskeletal: Normal range of motion. He exhibits no edema.   Neurological: He is alert and oriented to person, place, and time.   Skin: Skin is warm and dry.   Vitals reviewed.      Test(s) Reviewed  I have reviewed the following in detail:  [] Stress test   [] Angiography   [x] Echocardiogram   [x] Labs   [x] Other:  Carotid US       Assessment:       1. Essential hypertension    2. Bilateral carotid artery disease    3. Sinus bradycardia    4. Hyperlipidemia, unspecified hyperlipidemia type    5. Type 2 diabetes mellitus with diabetic neuropathy, with long-term current use of insulin    6. LISSETH (obstructive sleep apnea) - on CPAP    7. Aortic stenosis, mild         Plan:       Cardiac status stable  Continue present Rx  F/u 6 months with repeat Echo

## 2017-05-05 ENCOUNTER — OFFICE VISIT (OUTPATIENT)
Dept: ENDOCRINOLOGY | Facility: CLINIC | Age: 72
End: 2017-05-05
Payer: MEDICARE

## 2017-05-05 VITALS
WEIGHT: 174.5 LBS | BODY MASS INDEX: 32.94 KG/M2 | DIASTOLIC BLOOD PRESSURE: 70 MMHG | HEIGHT: 61 IN | SYSTOLIC BLOOD PRESSURE: 150 MMHG | HEART RATE: 72 BPM

## 2017-05-05 DIAGNOSIS — Z79.4 INSULIN LONG-TERM USE: ICD-10-CM

## 2017-05-05 DIAGNOSIS — E11.40 TYPE 2 DIABETES MELLITUS WITH DIABETIC NEUROPATHY, WITH LONG-TERM CURRENT USE OF INSULIN: Primary | ICD-10-CM

## 2017-05-05 DIAGNOSIS — Z79.4 TYPE 2 DIABETES MELLITUS WITH DIABETIC NEUROPATHY, WITH LONG-TERM CURRENT USE OF INSULIN: Primary | ICD-10-CM

## 2017-05-05 DIAGNOSIS — G63 POLYNEUROPATHY ASSOCIATED WITH UNDERLYING DISEASE: ICD-10-CM

## 2017-05-05 DIAGNOSIS — R80.9 PROTEINURIA, UNSPECIFIED TYPE: ICD-10-CM

## 2017-05-05 DIAGNOSIS — N18.30 CKD (CHRONIC KIDNEY DISEASE) STAGE 3, GFR 30-59 ML/MIN: ICD-10-CM

## 2017-05-05 DIAGNOSIS — E78.5 HYPERLIPIDEMIA, UNSPECIFIED HYPERLIPIDEMIA TYPE: ICD-10-CM

## 2017-05-05 PROCEDURE — 1126F AMNT PAIN NOTED NONE PRSNT: CPT | Mod: S$GLB,,, | Performed by: NURSE PRACTITIONER

## 2017-05-05 PROCEDURE — 3078F DIAST BP <80 MM HG: CPT | Mod: S$GLB,,, | Performed by: NURSE PRACTITIONER

## 2017-05-05 PROCEDURE — 4010F ACE/ARB THERAPY RXD/TAKEN: CPT | Mod: S$GLB,,, | Performed by: NURSE PRACTITIONER

## 2017-05-05 PROCEDURE — 99214 OFFICE O/P EST MOD 30 MIN: CPT | Mod: S$GLB,,, | Performed by: NURSE PRACTITIONER

## 2017-05-05 PROCEDURE — 99999 PR PBB SHADOW E&M-EST. PATIENT-LVL IV: CPT | Mod: PBBFAC,,, | Performed by: NURSE PRACTITIONER

## 2017-05-05 PROCEDURE — 3077F SYST BP >= 140 MM HG: CPT | Mod: S$GLB,,, | Performed by: NURSE PRACTITIONER

## 2017-05-05 PROCEDURE — 3045F PR MOST RECENT HEMOGLOBIN A1C LEVEL 7.0-9.0%: CPT | Mod: S$GLB,,, | Performed by: NURSE PRACTITIONER

## 2017-05-05 PROCEDURE — 1160F RVW MEDS BY RX/DR IN RCRD: CPT | Mod: S$GLB,,, | Performed by: NURSE PRACTITIONER

## 2017-05-05 PROCEDURE — 1159F MED LIST DOCD IN RCRD: CPT | Mod: S$GLB,,, | Performed by: NURSE PRACTITIONER

## 2017-05-05 NOTE — PROGRESS NOTES
Subjective:      Patient ID: Chema Ambrose is a 71 y.o. male.    Chief Complaint:  Routine DM f/u     History of Present Illness  CHIEF COMPLAINT: Type 2 diabetes    Pt is a 70 y/o wm with Type 2 DM since at least 2004, as well as chronic conditions pending review including HTN, HLP, neuropathy, and Osteopenia. Pt with limited cognitive abilities, poor historian. .  .      Interim Events:  Pt with severe hypoglycemia during sleep study.  Took supper insulin and then went sleep center with nothing to eat--glucoses dropped to 20's.   After that event he was advised to cut the regular dose of insulin with supper to 1/2  (of 15 units).  Now preBT readings markedly elevated.  FBS very good.  Markedly elevated glucoses before supper.  I suspect a lot of grazing on various things.        Note this is not current log--see [employer]--issues importing--but patterns are virtually the same.           Diabetes Flow Sheet:   Diabetes Medications: 75 NPH + 25 Regular with breakfast,   25 NPH and 15 Regular with Supper.    Prior Regimen---  Novolin 70/30--80 units before breakfast, 35 before supper.     Novolog 70/30  30 with breakfast, 40 with supper        suspected pt missing most shots. :Lantus 22 and NovoLog 10/12/12 with meals   Diabetes Complications: neuropathy   Aspirin: yes   Statin: yes- crestor   ACE/ARB: yes   Last Urine Microalbumin: 5/13   Last Eye exam: 8/13- [facility]   Last Diabetic Education:         PAST MEDICAL HISTORY: Type 2 diabetes for 10 years with neuropathy, osteopenia, hyperlipidemia, hypertension, GERD, sleep apnea using a CPAP machine, obesity, low testosterone    PAST SURGICAL HISTORY: Right CEA. Left CEA    SOCIAL HISTORY: He does use chewing tobacco. No alcohol use    FAMILY HISTORY: Diabetes      Review of Systems   Constitutional: Negative for activity change, appetite change, fatigue and unexpected weight change.   HENT: Negative for hearing loss and trouble swallowing.    Eyes: Negative for  photophobia and visual disturbance.        Eye exam--States 2016--outside eye doc   Respiratory: Negative for cough and shortness of breath.    Cardiovascular: Negative for chest pain, palpitations and leg swelling.   Gastrointestinal: Positive for constipation (occassional ). Negative for diarrhea.   Endocrine: Negative for polydipsia and polyuria (polyuria much improved. ).   Genitourinary: Negative for difficulty urinating and frequency.   Musculoskeletal: Negative for arthralgias and joint swelling.   Skin: Negative for rash and wound.   Neurological: Positive for numbness (feet). Negative for weakness.   Psychiatric/Behavioral: Negative for agitation and sleep disturbance. The patient is not nervous/anxious.        Objective:   Physical Exam   Constitutional: He is oriented to person, place, and time. He appears well-developed and well-nourished. No distress.   Appears approx age though fragile, borderline disheveled.    HENT:   Head: Normocephalic and atraumatic.   Nose: Nose normal.   Mouth/Throat: Oropharynx is clear and moist.   Eyes: Conjunctivae and EOM are normal. Pupils are equal, round, and reactive to light.   Neck: Normal range of motion. Neck supple. No tracheal deviation present. No thyromegaly present.   Cardiovascular: Normal rate and regular rhythm.  Exam reveals no friction rub.    Murmur heard.  Pulmonary/Chest: Effort normal and breath sounds normal. No respiratory distress. He has no wheezes.   Musculoskeletal: Normal range of motion. He exhibits no edema.        Lymphadenopathy:     He has no cervical adenopathy.   Neurological: He is alert and oriented to person, place, and time. He has normal reflexes. No cranial nerve deficit. He exhibits normal muscle tone. Coordination normal.   Skin: Skin is warm and dry. No rash noted. He is not diaphoretic. No erythema.   Psychiatric: He has a normal mood and affect. His behavior is normal. Judgment and thought content normal.     Vital  "Signs  Pulse: 72  BP: (!) 150/70  BP Method: Manual  Pain Score: 0-No pain  Height and Weight  Height: 5' 1" (154.9 cm)  Weight: 79.2 kg (174 lb 7.9 oz)  BSA (Calculated - sq m): 1.85 sq meters  BMI (Calculated): 33  Weight in (lb) to have BMI = 25: 132]      Lab Review:     Chemistry        Component Value Date/Time     04/12/2017 1506    K 3.8 04/12/2017 1506     04/12/2017 1506    CO2 18 (L) 04/12/2017 1506    BUN 20 04/12/2017 1506    CREATININE 1.2 04/12/2017 1506     (H) 04/12/2017 1506        Component Value Date/Time    CALCIUM 9.0 04/12/2017 1506    ALKPHOS 99 01/06/2017 0932    AST 21 01/06/2017 0932    ALT 27 01/06/2017 0932    BILITOT 0.8 01/06/2017 0932        Hemoglobin A1C   Date Value Ref Range Status   04/28/2017 8.9 (H) 4.5 - 6.2 % Final     Comment:     According to ADA guidelines, hemoglobin A1C <7.0% represents  optimal control in non-pregnant diabetic patients.  Different  metrics may apply to specific populations.   Standards of Medical Care in Diabetes - 2016.  For the purpose of screening for the presence of diabetes:  <5.7%     Consistent with the absence of diabetes  5.7-6.4%  Consistent with increasing risk for diabetes   (prediabetes)  >or=6.5%  Consistent with diabetes  Currently no consensus exists for use of hemoglobin A1C  for diagnosis of diabetes for children.     12/15/2016 11.6 (H) 0.0 - 5.6 % Final     Comment:     Reference Interval:  5.0 - 5.6 Normal   5.7 - 6.4 High Risk   > 6.5 Diabetic    Hgb A1c results are standardized based on the (NGSP) National   Glycohemoglobin Standardization Program.    Hemoglobin A1C levels are related to mean serum/plasma glucose   during the preceding 2-3 months.        12/15/2016 11.2 (H) 4.5 - 6.2 % Final     Comment:     According to ADA guidelines, hemoglobin A1C <7.0% represents  optimal control in non-pregnant diabetic patients.  Different  metrics may apply to specific populations.   Standards of Medical Care in Diabetes " - 2016.  For the purpose of screening for the presence of diabetes:  <5.7%     Consistent with the absence of diabetes  5.7-6.4%  Consistent with increasing risk for diabetes   (prediabetes)  >or=6.5%  Consistent with diabetes  Currently no consensus exists for use of hemoglobin A1C  for diagnosis of diabetes for children.       Lab Results   Component Value Date    LDLCALC 58.0 (L) 12/16/2016     Lab Results   Component Value Date    TSH 2.300 12/15/2016     Component      Latest Ref Rng & Units 12/15/2016   Microalbum.,U,Random      ug/mL 874.0   Creatinine, Random Ur      23.0 - 375.0 mg/dL 114.0   Microalb Creat Ratio      0.0 - 30.0 ug/mg 766.7 (H)       Assessment:     1. Type 2 diabetes mellitus with diabetic neuropathy, with long-term current use of insulin  Hemoglobin A1c  Chronic-uncontrolled --see plan    2. Polyneuropathy associated with underlying disease  -chronic-stable-monitor foot care.     3. Hyperlipidemia, unspecified hyperlipidemia type  --chronic-stable-atorvastatin 40 mg    4. Insulin long-term use     5. Proteinuria, unspecified type     6. CKD (chronic kidney disease) stage 3, GFR 30-59 ml/min  -chronic-stable-optimize BP and glucose         Plan:   Needs split mix insulin.      75 units of NPH (with 25 units of Regular) before breakfast,  Supper ==24 NPH (  with 15 Regular)     5/5/17  F/u 3 mo withi a1c or

## 2017-06-06 DIAGNOSIS — E11.40 TYPE 2 DIABETES MELLITUS WITH DIABETIC NEUROPATHY: ICD-10-CM

## 2017-06-06 DIAGNOSIS — Z79.4 INSULIN LONG-TERM USE: ICD-10-CM

## 2017-06-07 RX ORDER — HYDRALAZINE HYDROCHLORIDE 25 MG/1
TABLET, FILM COATED ORAL
Qty: 60 TABLET | Refills: 2 | Status: SHIPPED | OUTPATIENT
Start: 2017-06-07 | End: 2017-08-16 | Stop reason: DRUGHIGH

## 2017-06-07 RX ORDER — CALCIUM CITRATE/VITAMIN D3 200MG-6.25
TABLET ORAL
Qty: 100 EACH | Refills: 12 | Status: SHIPPED | OUTPATIENT
Start: 2017-06-07

## 2017-06-07 RX ORDER — ATORVASTATIN CALCIUM 40 MG/1
TABLET, FILM COATED ORAL
Qty: 90 TABLET | Refills: 1 | Status: SHIPPED | OUTPATIENT
Start: 2017-06-07 | End: 2017-08-16 | Stop reason: DRUGHIGH

## 2017-07-17 ENCOUNTER — PATIENT OUTREACH (OUTPATIENT)
Dept: ADMINISTRATIVE | Facility: HOSPITAL | Age: 72
End: 2017-07-17

## 2017-07-17 NOTE — LETTER
July 17, 2017    Chema Ambrose  [address] [address]             [facility]  [address]  [facility] [address]  Phone: [phone] Dear Mr. Ambrose:    [facility] is committed to your overall health.  Our records indicate that you are overdue for your annual dilated eye exam.   If you have had this done at an outside facility please let us know so we can obtain a copy of the results to update your chart.  If you have not had your eye exam done please contact your provider to schedule and appointment. Your last eye exam was done 7/13/2016.        If you have any questions or concerns, please don't hesitate to call.    Sincerely,        Saira Hendricks LPN Clinical Care Coordinator  [facility] [address] [place] [address]  [phone] (p)   [phone] (f)

## 2017-08-03 ENCOUNTER — TELEPHONE (OUTPATIENT)
Dept: FAMILY MEDICINE | Facility: CLINIC | Age: 72
End: 2017-08-03

## 2017-08-03 NOTE — TELEPHONE ENCOUNTER
----- Message from Noe Lester sent at 8/3/2017  9:55 AM CDT -----  Contact: [facility]  Pt is requesting a callback, pt needs a 1-2 week hospital f/u appt from 8-9-17  Call Back#[phone] or   Thanks

## 2017-08-03 NOTE — TELEPHONE ENCOUNTER
Phoned pt's spouse in regards to scheduling a hospital F/U in 1-2 weeks from 8/9/17. Nothing available. Pt had a heart attack and stroke per Ijeoma, pt's spouse. Please review and advise. CLC

## 2017-08-10 ENCOUNTER — LAB VISIT (OUTPATIENT)
Dept: LAB | Facility: HOSPITAL | Age: 72
End: 2017-08-10
Attending: PHYSICAL MEDICINE & REHABILITATION
Payer: MEDICARE

## 2017-08-10 DIAGNOSIS — E66.9 OBESITY, UNSPECIFIED: ICD-10-CM

## 2017-08-10 DIAGNOSIS — I63.9 IMPENDING CEREBROVASCULAR ACCIDENT: ICD-10-CM

## 2017-08-10 DIAGNOSIS — N18.30 CHRONIC KIDNEY DISEASE, STAGE III (MODERATE): ICD-10-CM

## 2017-08-10 DIAGNOSIS — Z87.19 PERSONAL HISTORY OF UNSPECIFIED DIGESTIVE DISEASE: ICD-10-CM

## 2017-08-10 DIAGNOSIS — A41.1 SEPSIS DUE TO STAPHYLOCOCCUS EPIDERMIDIS: ICD-10-CM

## 2017-08-10 DIAGNOSIS — E77.8: ICD-10-CM

## 2017-08-10 DIAGNOSIS — I15.9 OTHER SECONDARY HYPERTENSION, MALIGNANT: ICD-10-CM

## 2017-08-10 DIAGNOSIS — R13.10 PROBLEMS WITH SWALLOWING AND MASTICATION: ICD-10-CM

## 2017-08-10 DIAGNOSIS — I46.9 CARDIAC ARREST: ICD-10-CM

## 2017-08-10 DIAGNOSIS — I70.1 ATHEROSCLEROSIS OF RENAL ARTERY: ICD-10-CM

## 2017-08-10 DIAGNOSIS — G47.00 INSOMNIA WITH SLEEP APNEA, UNSPECIFIED: ICD-10-CM

## 2017-08-10 DIAGNOSIS — G93.1 ANOXIC BRAIN DAMAGE: ICD-10-CM

## 2017-08-10 DIAGNOSIS — I50.33 ACUTE ON CHRONIC DIASTOLIC HEART FAILURE: ICD-10-CM

## 2017-08-10 DIAGNOSIS — K21.9 ESOPHAGEAL REFLUX: ICD-10-CM

## 2017-08-10 DIAGNOSIS — E87.3 ALKALOSIS: ICD-10-CM

## 2017-08-10 DIAGNOSIS — E78.00 PURE HYPERCHOLESTEROLEMIA: ICD-10-CM

## 2017-08-10 DIAGNOSIS — G47.30 INSOMNIA WITH SLEEP APNEA, UNSPECIFIED: ICD-10-CM

## 2017-08-10 DIAGNOSIS — Z87.891 PERSONAL HISTORY OF TOBACCO USE, PRESENTING HAZARDS TO HEALTH: ICD-10-CM

## 2017-08-10 DIAGNOSIS — E83.51 HYPOCALCEMIA: ICD-10-CM

## 2017-08-10 DIAGNOSIS — N17.0 ACUTE KIDNEY FAILURE WITH LESION OF TUBULAR NECROSIS: ICD-10-CM

## 2017-08-10 DIAGNOSIS — I21.09: ICD-10-CM

## 2017-08-10 DIAGNOSIS — J96.01 ACUTE RESPIRATORY FAILURE WITH HYPOXIA: ICD-10-CM

## 2017-08-10 DIAGNOSIS — K56.7 DYNAMIC ILEUS: ICD-10-CM

## 2017-08-10 DIAGNOSIS — Z87.19 PERSONAL HISTORY OF UNSPECIFIED DIGESTIVE DISEASE: Primary | ICD-10-CM

## 2017-08-10 LAB
ANION GAP SERPL CALC-SCNC: 12 MMOL/L
BASOPHILS # BLD AUTO: 0.03 K/UL
BASOPHILS NFR BLD: 0.4 %
BUN SERPL-MCNC: 27 MG/DL
CALCIUM SERPL-MCNC: 8.7 MG/DL
CHLORIDE SERPL-SCNC: 106 MMOL/L
CO2 SERPL-SCNC: 21 MMOL/L
CREAT SERPL-MCNC: 1.6 MG/DL
DIFFERENTIAL METHOD: ABNORMAL
EOSINOPHIL # BLD AUTO: 0.2 K/UL
EOSINOPHIL NFR BLD: 2.2 %
ERYTHROCYTE [DISTWIDTH] IN BLOOD BY AUTOMATED COUNT: 15.5 %
EST. GFR  (AFRICAN AMERICAN): 48.7 ML/MIN/1.73 M^2
EST. GFR  (NON AFRICAN AMERICAN): 42.1 ML/MIN/1.73 M^2
GLUCOSE SERPL-MCNC: 240 MG/DL
HCT VFR BLD AUTO: 25.9 %
HGB BLD-MCNC: 8.3 G/DL
LYMPHOCYTES # BLD AUTO: 1.3 K/UL
LYMPHOCYTES NFR BLD: 18.4 %
MCH RBC QN AUTO: 29.3 PG
MCHC RBC AUTO-ENTMCNC: 32 G/DL
MCV RBC AUTO: 92 FL
MONOCYTES # BLD AUTO: 0.7 K/UL
MONOCYTES NFR BLD: 9.7 %
NEUTROPHILS # BLD AUTO: 4.8 K/UL
NEUTROPHILS NFR BLD: 69 %
PLATELET # BLD AUTO: 367 K/UL
PMV BLD AUTO: 9.7 FL
POTASSIUM SERPL-SCNC: 4.1 MMOL/L
RBC # BLD AUTO: 2.83 M/UL
SODIUM SERPL-SCNC: 139 MMOL/L
WBC # BLD AUTO: 6.89 K/UL

## 2017-08-10 PROCEDURE — 85025 COMPLETE CBC W/AUTO DIFF WBC: CPT

## 2017-08-10 PROCEDURE — 36415 COLL VENOUS BLD VENIPUNCTURE: CPT | Mod: PO

## 2017-08-10 PROCEDURE — 80048 BASIC METABOLIC PNL TOTAL CA: CPT

## 2017-08-16 ENCOUNTER — LAB VISIT (OUTPATIENT)
Dept: LAB | Facility: HOSPITAL | Age: 72
End: 2017-08-16
Attending: INTERNAL MEDICINE
Payer: MEDICARE

## 2017-08-16 ENCOUNTER — OFFICE VISIT (OUTPATIENT)
Dept: INTERNAL MEDICINE | Facility: CLINIC | Age: 72
End: 2017-08-16
Payer: MEDICARE

## 2017-08-16 VITALS
HEIGHT: 61 IN | WEIGHT: 163.81 LBS | DIASTOLIC BLOOD PRESSURE: 70 MMHG | HEART RATE: 50 BPM | BODY MASS INDEX: 30.93 KG/M2 | SYSTOLIC BLOOD PRESSURE: 146 MMHG | OXYGEN SATURATION: 98 %

## 2017-08-16 DIAGNOSIS — Z86.74 HISTORY OF SUDDEN CARDIAC ARREST SUCCESSFULLY RESUSCITATED: ICD-10-CM

## 2017-08-16 DIAGNOSIS — Z09 HOSPITAL DISCHARGE FOLLOW-UP: ICD-10-CM

## 2017-08-16 DIAGNOSIS — E11.22 TYPE 2 DM WITH CKD STAGE 3 AND HYPERTENSION: ICD-10-CM

## 2017-08-16 DIAGNOSIS — G47.00 INSOMNIA, UNSPECIFIED TYPE: ICD-10-CM

## 2017-08-16 DIAGNOSIS — J96.00 ACUTE RESPIRATORY FAILURE: ICD-10-CM

## 2017-08-16 DIAGNOSIS — E87.0 HYPEROSMOLALITY AND/OR HYPERNATREMIA: ICD-10-CM

## 2017-08-16 DIAGNOSIS — I15.2 HYPERTENSION ASSOCIATED WITH DIABETES: ICD-10-CM

## 2017-08-16 DIAGNOSIS — N18.30 TYPE 2 DM WITH CKD STAGE 3 AND HYPERTENSION: ICD-10-CM

## 2017-08-16 DIAGNOSIS — D72.829 LEUCOCYTOSIS: ICD-10-CM

## 2017-08-16 DIAGNOSIS — E78.5 TYPE 2 DIABETES MELLITUS WITH HYPERLIPIDEMIA: ICD-10-CM

## 2017-08-16 DIAGNOSIS — N18.30 CHRONIC KIDNEY DISEASE, STAGE III (MODERATE): ICD-10-CM

## 2017-08-16 DIAGNOSIS — E11.8 TYPE II DIABETES MELLITUS WITH MANIFESTATIONS: ICD-10-CM

## 2017-08-16 DIAGNOSIS — G93.1 HYPOXIC BRAIN INJURY: ICD-10-CM

## 2017-08-16 DIAGNOSIS — I46.9 CARDIAC ARREST: ICD-10-CM

## 2017-08-16 DIAGNOSIS — I12.9 PARENCHYMAL RENAL HYPERTENSION: ICD-10-CM

## 2017-08-16 DIAGNOSIS — E11.59 HYPERTENSION ASSOCIATED WITH DIABETES: ICD-10-CM

## 2017-08-16 DIAGNOSIS — E11.69 TYPE 2 DIABETES MELLITUS WITH HYPERLIPIDEMIA: ICD-10-CM

## 2017-08-16 DIAGNOSIS — N17.9 ACUTE KIDNEY FAILURE, UNSPECIFIED: Primary | ICD-10-CM

## 2017-08-16 DIAGNOSIS — I12.9 TYPE 2 DM WITH CKD STAGE 3 AND HYPERTENSION: ICD-10-CM

## 2017-08-16 LAB
BACTERIA #/AREA URNS HPF: ABNORMAL /HPF
BASOPHILS # BLD AUTO: 0.04 K/UL
BASOPHILS NFR BLD: 0.5 %
BILIRUB UR QL STRIP: NEGATIVE
CLARITY UR: CLEAR
COLOR UR: YELLOW
DIFFERENTIAL METHOD: ABNORMAL
EOSINOPHIL # BLD AUTO: 0.1 K/UL
EOSINOPHIL NFR BLD: 1.3 %
ERYTHROCYTE [DISTWIDTH] IN BLOOD BY AUTOMATED COUNT: 15.1 %
GLUCOSE UR QL STRIP: NEGATIVE
HCT VFR BLD AUTO: 32.2 %
HGB BLD-MCNC: 10.2 G/DL
HGB UR QL STRIP: NEGATIVE
HYALINE CASTS #/AREA URNS LPF: 4 /LPF
KETONES UR QL STRIP: NEGATIVE
LEUKOCYTE ESTERASE UR QL STRIP: NEGATIVE
LYMPHOCYTES # BLD AUTO: 1.4 K/UL
LYMPHOCYTES NFR BLD: 17.7 %
MCH RBC QN AUTO: 28.7 PG
MCHC RBC AUTO-ENTMCNC: 31.7 G/DL
MCV RBC AUTO: 90 FL
MICROSCOPIC COMMENT: ABNORMAL
MONOCYTES # BLD AUTO: 0.6 K/UL
MONOCYTES NFR BLD: 7.1 %
NEUTROPHILS # BLD AUTO: 5.7 K/UL
NEUTROPHILS NFR BLD: 73.1 %
NITRITE UR QL STRIP: NEGATIVE
PH UR STRIP: 6 [PH] (ref 5–8)
PLATELET # BLD AUTO: 490 K/UL
PMV BLD AUTO: 9 FL
PROT UR QL STRIP: ABNORMAL
RBC # BLD AUTO: 3.56 M/UL
RBC #/AREA URNS HPF: 1 /HPF (ref 0–4)
SP GR UR STRIP: 1.02 (ref 1–1.03)
SQUAMOUS #/AREA URNS HPF: ABNORMAL /HPF
URN SPEC COLLECT METH UR: ABNORMAL
WBC # BLD AUTO: 7.75 K/UL
WBC #/AREA URNS HPF: 1 /HPF (ref 0–5)

## 2017-08-16 PROCEDURE — 83735 ASSAY OF MAGNESIUM: CPT

## 2017-08-16 PROCEDURE — 3045F PR MOST RECENT HEMOGLOBIN A1C LEVEL 7.0-9.0%: CPT | Mod: S$GLB,,, | Performed by: INTERNAL MEDICINE

## 2017-08-16 PROCEDURE — 99215 OFFICE O/P EST HI 40 MIN: CPT | Mod: S$GLB,,, | Performed by: INTERNAL MEDICINE

## 2017-08-16 PROCEDURE — 3008F BODY MASS INDEX DOCD: CPT | Mod: S$GLB,,, | Performed by: INTERNAL MEDICINE

## 2017-08-16 PROCEDURE — 36415 COLL VENOUS BLD VENIPUNCTURE: CPT | Mod: PO

## 2017-08-16 PROCEDURE — 85025 COMPLETE CBC W/AUTO DIFF WBC: CPT

## 2017-08-16 PROCEDURE — 80053 COMPREHEN METABOLIC PANEL: CPT

## 2017-08-16 PROCEDURE — 84100 ASSAY OF PHOSPHORUS: CPT

## 2017-08-16 PROCEDURE — 3078F DIAST BP <80 MM HG: CPT | Mod: S$GLB,,, | Performed by: INTERNAL MEDICINE

## 2017-08-16 PROCEDURE — 1126F AMNT PAIN NOTED NONE PRSNT: CPT | Mod: S$GLB,,, | Performed by: INTERNAL MEDICINE

## 2017-08-16 PROCEDURE — 81000 URINALYSIS NONAUTO W/SCOPE: CPT | Mod: PO

## 2017-08-16 PROCEDURE — 1159F MED LIST DOCD IN RCRD: CPT | Mod: S$GLB,,, | Performed by: INTERNAL MEDICINE

## 2017-08-16 PROCEDURE — 99499 UNLISTED E&M SERVICE: CPT | Mod: S$GLB,,, | Performed by: INTERNAL MEDICINE

## 2017-08-16 PROCEDURE — 84550 ASSAY OF BLOOD/URIC ACID: CPT

## 2017-08-16 PROCEDURE — 3077F SYST BP >= 140 MM HG: CPT | Mod: S$GLB,,, | Performed by: INTERNAL MEDICINE

## 2017-08-16 PROCEDURE — 99999 PR PBB SHADOW E&M-EST. PATIENT-LVL IV: CPT | Mod: PBBFAC,,, | Performed by: INTERNAL MEDICINE

## 2017-08-16 PROCEDURE — 82570 ASSAY OF URINE CREATININE: CPT

## 2017-08-16 RX ORDER — TRAZODONE HYDROCHLORIDE 100 MG/1
100 TABLET ORAL NIGHTLY
COMMUNITY
Start: 2017-08-09

## 2017-08-16 RX ORDER — HYDROCHLOROTHIAZIDE 25 MG/1
1 TABLET ORAL DAILY
COMMUNITY
Start: 2017-08-09 | End: 2017-09-25 | Stop reason: SDUPTHER

## 2017-08-16 RX ORDER — LACTULOSE 10 G/15ML
20 SOLUTION ORAL
COMMUNITY
Start: 2017-08-09

## 2017-08-16 RX ORDER — TALC
3 POWDER (GRAM) TOPICAL NIGHTLY PRN
COMMUNITY
Start: 2017-08-09 | End: 2017-09-08

## 2017-08-16 RX ORDER — CYCLOBENZAPRINE HCL 10 MG
10 TABLET ORAL EVERY 8 HOURS PRN
COMMUNITY
Start: 2017-08-09

## 2017-08-16 RX ORDER — HYDRALAZINE HYDROCHLORIDE 50 MG/1
1 TABLET, FILM COATED ORAL 3 TIMES DAILY
COMMUNITY
Start: 2017-08-09 | End: 2017-09-25 | Stop reason: SDUPTHER

## 2017-08-16 RX ORDER — ATORVASTATIN CALCIUM 80 MG/1
1 TABLET, FILM COATED ORAL NIGHTLY
COMMUNITY
Start: 2017-08-09 | End: 2017-09-25 | Stop reason: SDUPTHER

## 2017-08-16 RX ORDER — INSULIN GLARGINE 100 [IU]/ML
14 INJECTION, SOLUTION SUBCUTANEOUS NIGHTLY
COMMUNITY
Start: 2017-08-09 | End: 2017-09-08

## 2017-08-16 RX ORDER — CLOPIDOGREL BISULFATE 75 MG/1
75 TABLET ORAL DAILY
COMMUNITY
Start: 2017-08-09 | End: 2017-09-25 | Stop reason: SDUPTHER

## 2017-08-16 RX ORDER — METOPROLOL TARTRATE 25 MG/1
25 TABLET, FILM COATED ORAL 2 TIMES DAILY
COMMUNITY
Start: 2017-08-09 | End: 2017-09-25 | Stop reason: SDUPTHER

## 2017-08-16 RX ORDER — AMLODIPINE BESYLATE 5 MG/1
5 TABLET ORAL NIGHTLY
COMMUNITY
Start: 2017-08-09 | End: 2017-09-08

## 2017-08-16 NOTE — PROGRESS NOTES
"HISTORY OF PRESENT ILLNESS:  Pt. is a 71 y.o. male presents for hospital discharge followup and monitoring of his Dm Type 2, HTN, carotoid stenosis, hyperlipidemia, LISSETH.  He has seen both endocrinology and cardiology, last hgb A1C was 8.9 with endocrine, on arrival, hgb A1C was 10.3.  Wife had found him down and she called her daughter, when she arrived, she called 911.      His hospital course at [place]:    "Pt admitted after being found unresponsive in the bathroom. He required intubation at the scene and was transported to the ER. 7/4/17:LEFT HEART CATHETERIZATION, Bilateral selective coronary angiography, LV angio, insertion of temporary TV pacemaker, PTCA and sten of the prox to mid LAD With 2 WAYNE, CPR/ACLS for V fib cardiac arrest during the procedure. With findings consistent for New onset IVCD, syncope, asystole and CPR in ER, intubated patient, multiple CAD risk factors. He was in acute renal failure. On IV bicarb with MAP >60 maintained. NG for increased abd distention. eletrolytes replaced. MRI: 7/11/17:The diffusion-weighted sequence demonstrates evidence of multiple tiny scattered acute or recent ischemic microinfarctions within the right cerebellum inferiorly and the left cerebrum anteriorly and superiorly suggestive of multiple scattered, bilateral microemboli. Incidental mild to slightly moderate possibly active left sinusitis, particular the left maxillary and ethmoid sinuses. Only minimal to mild age-related bilateral cerebral involutional changes and deep cerebral white matter chronic small vessel ischemic changes. He was dx with arrest with hypoxic brain injury"    He was also found to be anemic during stay.    He was transitioned to rehab.    Wife giggles with discussion of diet.  HE eats spaghetti, ate hamburger, cantaloupe.  Had some ice cream.  Lunch was shrimp.  He drinks mostly water, small amount of cold drink, diet Dr. Pepper.  He sees Dr. Wang, has appt.  He doesn't know that he " was hospitalized.  He is seeing cardiology, Dr. Kiser, has appt. 9/1/17.  Also has appt. With Dr. Raphael of neuro 9/6/17.  Will be seeing Dr. Moyer of renal for hospital followup.  He states he feels well, ambulating with walker, some shortness of breath.  Denies any diarrhea or constipation.          ROS:  GENERAL: No fever, chills, fatigability or weight loss.  SKIN: No rashes, itching or changes in color or texture of skin.  HEAD: No headaches or recent head trauma.  EARS: Denies ear pain, discharge or vertigo.  NOSE: No loss of smell, no epistaxis or postnasal drip.  MOUTH & THROAT: No hoarseness or change in voice. No excessive gum bleeding.  NODES: Denies swollen glands.  CHEST: Denies FRIAS, cyanosis, wheezing, cough and sputum production.  CARDIOVASCULAR: Denies chest pain, PND, orthopnea or reduced exercise tolerance.  ABDOMEN: Appetite fine. No weight loss. Denies constipation, diarrhea, abdominal pain, hematemesis or blood in stool.  URINARY: No flank pain, dysuria or hematuria.  PERIPHERAL VASCULAR: No claudication or cyanosis. No edema.  MUSCULOSKELETAL: No joint stiffness or swelling. Denies back pain.  NEUROLOGIC: Denies numbness    PE:   Vitals:   Vitals:    08/16/17 1122   BP: (!) 146/70   Pulse: (!) 50     GENERAL: no acute distress, A&Ox3, comfortable.  Male with BMI of 30   HEENT: tympanic membranes clear, nasal mucosa pink, no pharyngeal erythema or exudate  NECK: supple, no cervical lymphadenopathy, no thyromegaly; no supraclavicular nodes;   CHEST:  clear to auscultation bilaterally, no crackles or wheeze; no increased work of breathing;  CARDIOVASCULAR: regular rate and rhythm, no rubs, murmurs or gallops.  ABDOMEN: normal bowel sounds, soft non-tender, non-distended; no palpable organomegaly;   EXT: no clubbing, cyanosis or edema.     ASSESSMENT/PLAN:    Hospital discharge follow-up  -     Ambulatory referral to Nephrology    History of sudden cardiac arrest successfully resuscitated: has  upcoming appt. With Dr. Kiser; is on both apixaban and plavix; will check further;    Hypoxic brain injury: has upcoming appt with neurology, Dr. Raphael at [place];    Type 2 DM with CKD stage 3 and hypertension:  -     Ambulatory referral to Nephrology  -     Ambulatory referral to Optometry  Have had long discussion with pt, wife, and Navigator concerning issues with compliance of meds and diet;    Hypertension associated with diabetes: in control; will continue current meds;    Type 2 diabetes mellitus with hyperlipidemia  -     Lipid panel; Future; Expected date: 08/16/2017    Insomnia, unspecified type: on trazodone;        Call if condition changes or worsens.

## 2017-08-17 LAB
ALBUMIN SERPL BCP-MCNC: 3.3 G/DL
ALP SERPL-CCNC: 146 U/L
ALT SERPL W/O P-5'-P-CCNC: 49 U/L
ANION GAP SERPL CALC-SCNC: 8 MMOL/L
AST SERPL-CCNC: 33 U/L
BILIRUB SERPL-MCNC: 0.5 MG/DL
BUN SERPL-MCNC: 16 MG/DL
CALCIUM SERPL-MCNC: 9.5 MG/DL
CHLORIDE SERPL-SCNC: 105 MMOL/L
CO2 SERPL-SCNC: 27 MMOL/L
CREAT SERPL-MCNC: 1.3 MG/DL
CREAT UR-MCNC: 75 MG/DL
EST. GFR  (AFRICAN AMERICAN): >60 ML/MIN/1.73 M^2
EST. GFR  (NON AFRICAN AMERICAN): 54.1 ML/MIN/1.73 M^2
GLUCOSE SERPL-MCNC: 165 MG/DL
MAGNESIUM SERPL-MCNC: 1.4 MG/DL
PHOSPHATE SERPL-MCNC: 3 MG/DL
POTASSIUM SERPL-SCNC: 4.5 MMOL/L
PROT SERPL-MCNC: 7.4 G/DL
PROT UR-MCNC: 83 MG/DL
PROT/CREAT RATIO, UR: 1.11
SODIUM SERPL-SCNC: 140 MMOL/L
URATE SERPL-MCNC: 6.7 MG/DL

## 2017-08-20 ENCOUNTER — TELEPHONE (OUTPATIENT)
Dept: INTERNAL MEDICINE | Facility: CLINIC | Age: 72
End: 2017-08-20

## 2017-08-20 PROBLEM — Z86.74 HISTORY OF SUDDEN CARDIAC ARREST SUCCESSFULLY RESUSCITATED: Status: ACTIVE | Noted: 2017-08-20

## 2017-08-20 PROBLEM — G47.00 INSOMNIA: Status: ACTIVE | Noted: 2017-08-20

## 2017-08-20 PROBLEM — G93.1 HYPOXIC BRAIN INJURY: Status: ACTIVE | Noted: 2017-08-20

## 2017-08-20 NOTE — TELEPHONE ENCOUNTER
Please call Nell, I do not think pt. Is supposed to be on both Plavix and apixaban.  Please check.

## 2017-08-21 ENCOUNTER — OFFICE VISIT (OUTPATIENT)
Dept: PODIATRY | Facility: CLINIC | Age: 72
End: 2017-08-21
Payer: MEDICARE

## 2017-08-21 VITALS — BODY MASS INDEX: 30.63 KG/M2 | HEIGHT: 61 IN | WEIGHT: 162.25 LBS

## 2017-08-21 DIAGNOSIS — L97.522 DIABETIC ULCER OF TOE OF LEFT FOOT ASSOCIATED WITH TYPE 2 DIABETES MELLITUS, WITH FAT LAYER EXPOSED: ICD-10-CM

## 2017-08-21 DIAGNOSIS — E11.621 DIABETIC ULCER OF TOE OF LEFT FOOT ASSOCIATED WITH TYPE 2 DIABETES MELLITUS, WITH FAT LAYER EXPOSED: ICD-10-CM

## 2017-08-21 DIAGNOSIS — L03.032 CELLULITIS OF TOE OF LEFT FOOT: ICD-10-CM

## 2017-08-21 DIAGNOSIS — E11.49 TYPE II DIABETES MELLITUS WITH NEUROLOGICAL MANIFESTATIONS: Primary | ICD-10-CM

## 2017-08-21 PROCEDURE — 99499 UNLISTED E&M SERVICE: CPT | Mod: S$GLB,,,

## 2017-08-21 PROCEDURE — 11043 DBRDMT MUSC&/FSCA 1ST 20/<: CPT | Mod: S$GLB,,,

## 2017-08-21 PROCEDURE — 3045F PR MOST RECENT HEMOGLOBIN A1C LEVEL 7.0-9.0%: CPT | Mod: S$GLB,,,

## 2017-08-21 PROCEDURE — 3008F BODY MASS INDEX DOCD: CPT | Mod: S$GLB,,,

## 2017-08-21 PROCEDURE — 1125F AMNT PAIN NOTED PAIN PRSNT: CPT | Mod: S$GLB,,,

## 2017-08-21 PROCEDURE — 99213 OFFICE O/P EST LOW 20 MIN: CPT | Mod: 25,S$GLB,,

## 2017-08-21 PROCEDURE — 96372 THER/PROPH/DIAG INJ SC/IM: CPT | Mod: 59,S$GLB,,

## 2017-08-21 PROCEDURE — 1159F MED LIST DOCD IN RCRD: CPT | Mod: S$GLB,,,

## 2017-08-21 PROCEDURE — 99999 PR PBB SHADOW E&M-EST. PATIENT-LVL II: CPT | Mod: PBBFAC,,,

## 2017-08-21 RX ORDER — CLINDAMYCIN HYDROCHLORIDE 300 MG/1
300 CAPSULE ORAL 3 TIMES DAILY
Qty: 30 CAPSULE | Refills: 0 | Status: SHIPPED | OUTPATIENT
Start: 2017-08-21 | End: 2017-08-31

## 2017-08-21 RX ORDER — CEFTRIAXONE 1 G/1
1 INJECTION, POWDER, FOR SOLUTION INTRAMUSCULAR; INTRAVENOUS
Status: COMPLETED | OUTPATIENT
Start: 2017-08-21 | End: 2017-08-21

## 2017-08-21 RX ADMIN — CEFTRIAXONE 1 G: 1 INJECTION, POWDER, FOR SOLUTION INTRAMUSCULAR; INTRAVENOUS at 02:08

## 2017-08-21 NOTE — TELEPHONE ENCOUNTER
Phoned Dr Kiser' office in regards to message regarding pt's medications of RX apixaban 5 mg 1 tablet PO 2 times daily and clopidogrel 75 mg 1 tab daily as well as 81 mg aspirin daily. Spoke to Adilene and was transferred to Nicki and then was transferred to a . LMOR to return call to [facility] #. CLC

## 2017-08-21 NOTE — PROGRESS NOTES
Subjective:       Patient ID: Chema Ambrose is a 71 y.o. male.    Chief Complaint: Diabetic Foot Exam (PCP:  Dr Garcia  8/16/17; HgbA1c:  4/28/17  8.9) and Foot Problem (redness, swelling, sore and rash - left foot)    HPI  Chema is a 71 y.o. male who presents to the clinic for evaluation and treatment of high risk feet. Chema has a past medical history of Asthma; Cataract; Chronic renal disease, stage 3, moderately decreased glomerular filtration rate between 30-59 mL/min/1.73 square meter; DM type 2 (diabetes mellitus, type 2); GERD (gastroesophageal reflux disease); HTN (hypertension); Hyperlipidemia; Kidney stone (2003); Low serum testosterone level; Murmur, cardiac; Myocardial infarction; Obesity; LISSETH (obstructive sleep apnea); Osteopenia; Pancreatitis (12/26/13); Seasonal allergies; Stroke; and Urinary tract infection. The patient's chief complaint is a red swollen left 2nd toe with ulcer x 1 week. This patient has documented high risk feet requiring routine maintenance secondary to peripheral neuropathy and PVD.    PCP: Марина Garcia MD        Current shoe gear:  Affected Foot: Tennis shoes     Unaffected Foot: Tennis shoes    Hemoglobin A1C   Date Value Ref Range Status   04/28/2017 8.9 (H) 4.5 - 6.2 % Final     Comment:     According to ADA guidelines, hemoglobin A1C <7.0% represents  optimal control in non-pregnant diabetic patients.  Different  metrics may apply to specific populations.   Standards of Medical Care in Diabetes - 2016.  For the purpose of screening for the presence of diabetes:  <5.7%     Consistent with the absence of diabetes  5.7-6.4%  Consistent with increasing risk for diabetes   (prediabetes)  >or=6.5%  Consistent with diabetes  Currently no consensus exists for use of hemoglobin A1C  for diagnosis of diabetes for children.     12/15/2016 11.6 (H) 0.0 - 5.6 % Final     Comment:     Reference Interval:  5.0 - 5.6 Normal   5.7 - 6.4 High Risk   > 6.5 Diabetic    Hgb A1c  results are standardized based on the (NGSP) National   Glycohemoglobin Standardization Program.    Hemoglobin A1C levels are related to mean serum/plasma glucose   during the preceding 2-3 months.        12/15/2016 11.2 (H) 4.5 - 6.2 % Final     Comment:     According to ADA guidelines, hemoglobin A1C <7.0% represents  optimal control in non-pregnant diabetic patients.  Different  metrics may apply to specific populations.   Standards of Medical Care in Diabetes - 2016.  For the purpose of screening for the presence of diabetes:  <5.7%     Consistent with the absence of diabetes  5.7-6.4%  Consistent with increasing risk for diabetes   (prediabetes)  >or=6.5%  Consistent with diabetes  Currently no consensus exists for use of hemoglobin A1C  for diagnosis of diabetes for children.       Review of Systems  ROS:  Constitution: Negative for chills, fever, weakness and malaise/fatigue.   HEENT: Negative for headaches.   Cardiovascular: Negative for chest pain and claudication.   Respiratory: Negative for cough and shortness of breath.   Musculoskeletal: Positive for foot pain.  Negative for muscle cramps and muscle weakness.   Gastrointestinal: Negative for nausea and vomiting.   Neurological: Positive for numbness and paresthesias.   Dermatological: Positive for wound.        Objective:      Physical Exam  Constitutional:   Patient is oriented to person, place, and time. Vital signs are normal. Appears well-developed and well-nourished.     Vascular:   Dorsalis pedis pulses are 2+ on the right side, and 2+ on the left side.   Posterior tibial pulses are 1+ on the right side, and 1+ on the left side.   - digital hair growth, capillary fill time to all toes <3 seconds, toes are cool to touch  + swelling feet and ankles    Skin/Dermatological:   Skin is thin, warm, shiny and atrophic. No cyanosis or clubbing. No rashes noted. No open wounds.   All ten toenails yellow discolored, thickened 3 mm, elongated 3 mm with  subungual debris and tenderness.  Ulcer located left medial 2nd toe*, measuring pre debridement 0.6 x 0.6 x 0.2 cm ,  to muscle level, slough base, +overlying keratosis, +edema, + erythema, - drainage, -fluctuance    Musculoskeletal:   Mild bunions, hammertoes and bunionettes observed.  Decreased range of motion of bilateral midtarsal, subtalar joints, ankle joint dorsiflexion is restricted bilaterally. Muscle strength to tibialis anterior, extensor hallucis longus, extensor digitorum longus, peroneal muscles, flexor hallucis/digotorum longus, posterior tibial and gastrosoleal complex is 5/5.    Neurological:   Positive deficits to sharp/dull, light touch or vibratory sensation bilateral feet             Assessment:       1. Type II diabetes mellitus with neurological manifestations    2. Diabetic ulcer of toe of left foot associated with type 2 diabetes mellitus, with fat layer exposed    3. Cellulitis of toe of left foot        Plan:       Type II diabetes mellitus with neurological manifestations    Diabetic ulcer of toe of left foot associated with type 2 diabetes mellitus, with fat layer exposed  -     cefTRIAXone injection 1 g; Inject 1 g into the muscle one time.    Cellulitis of toe of left foot  -     cefTRIAXone injection 1 g; Inject 1 g into the muscle one time.    Other orders  -     clindamycin (CLEOCIN) 300 MG capsule; Take 1 capsule (300 mg total) by mouth 3 (three) times daily.  Dispense: 30 capsule; Refill: 0          Shoe inspection. Diabetic Foot Education. Patient reminded of the importance of good nutrition and blood sugar control to help prevent podiatric complications of diabetes. Patient instructed on proper foot hygeine. We discussed wearing proper shoe gear, daily foot inspections, never walking without protective shoe gear, never putting sharp instruments to feet.  We also discussed padding and shoes with high toe boxes for foot deformities.    Wound Debridement  Date/Time: 8/21/2017 2:35  "PM  Performed by: BEBETO PERES  Authorized by: BEBETO PERES     Time out: Immediately prior to procedure a "time out" was called to verify the correct patient, procedure, equipment, support staff and site/side marked as required.    Consent Done?:  Yes (Verbal)    Preparation: Patient was prepped and draped in usual sterile fashion      Type of Debridement:  Excisional       Length (cm):  0.8       Area (sq cm):  0.64       Width (cm):  0.8       Percent Debrided (%):  100       Depth (cm):  0.3       Total Area Debrided (sq cm):  0.64    Depth of debridement:  Muscle/fascia/tendon    Tissue debrided:  Adipose    Devitalized tissue debrided:  Maya    Instruments:  Blade    Bleeding:  None  Patient tolerance:  Patient tolerated the procedure well with no immediate complications      Football dressing applied, HWB, sc shoe, he will f/u with Dr. Moore in 3 days.      "

## 2017-08-22 NOTE — TELEPHONE ENCOUNTER
Farzaneh Hewitt MD is who rxd these meds upon Discharge. Contacted her office today [phone]    Was transferred and put on hold for over 20 minutes. Had to disconnect.

## 2017-08-22 NOTE — TELEPHONE ENCOUNTER
Spoke to Tressa, the nurse who work with Dr. Kiser. She states patient has not been seen by Dr. Kiser yet but has an upcoming appt. She is going to confirm with another nurse, Svitlana and call back.

## 2017-08-22 NOTE — TELEPHONE ENCOUNTER
----- Message from Mayelin Trujillo sent at 8/22/2017 12:01 PM CDT -----  Contact: Tiffany, with DR. Montez Kiser office # [phone]  Returning call for Barbara  Call back on # [phone]  thanks

## 2017-08-22 NOTE — TELEPHONE ENCOUNTER
Izabela Ely from Dr. Hutchison's office. She states patient has never been seen by Dr. Hutchison. His only encounter was that he read an EKG for patient when patient was in [place] in 2013. Please advise.

## 2017-08-22 NOTE — TELEPHONE ENCOUNTER
Dr Garcia  Have been calling to see who this patient is seeing for cards. He is not a Dr Kiser pt. The office states it will be Dr Nixon (spelling).

## 2017-08-22 NOTE — TELEPHONE ENCOUNTER
Called [facility]. Was inform we have to sent fax request to get who the patient was seen by in the hospital.

## 2017-08-22 NOTE — TELEPHONE ENCOUNTER
Spoke to Tia at Dr. Walter's office. She states that the patient did not have either medication on his record. And he has not been seen by Dr. Walter since October 2. 2015. Gali sent message to Nell to help.

## 2017-08-24 ENCOUNTER — OFFICE VISIT (OUTPATIENT)
Dept: PODIATRY | Facility: CLINIC | Age: 72
End: 2017-08-24
Payer: MEDICARE

## 2017-08-24 DIAGNOSIS — E11.49 TYPE II DIABETES MELLITUS WITH NEUROLOGICAL MANIFESTATIONS: ICD-10-CM

## 2017-08-24 DIAGNOSIS — L97.522 DIABETIC ULCER OF TOE OF LEFT FOOT ASSOCIATED WITH TYPE 2 DIABETES MELLITUS, WITH FAT LAYER EXPOSED: Primary | ICD-10-CM

## 2017-08-24 DIAGNOSIS — I73.9 PVD (PERIPHERAL VASCULAR DISEASE): ICD-10-CM

## 2017-08-24 DIAGNOSIS — E11.621 DIABETIC ULCER OF TOE OF LEFT FOOT ASSOCIATED WITH TYPE 2 DIABETES MELLITUS, WITH FAT LAYER EXPOSED: Primary | ICD-10-CM

## 2017-08-24 PROCEDURE — 99499 UNLISTED E&M SERVICE: CPT | Mod: S$GLB,,, | Performed by: PODIATRIST

## 2017-08-24 PROCEDURE — 99213 OFFICE O/P EST LOW 20 MIN: CPT | Mod: S$GLB,,, | Performed by: PODIATRIST

## 2017-08-24 PROCEDURE — 87075 CULTR BACTERIA EXCEPT BLOOD: CPT

## 2017-08-24 PROCEDURE — 87186 SC STD MICRODIL/AGAR DIL: CPT

## 2017-08-24 PROCEDURE — 99999 PR PBB SHADOW E&M-EST. PATIENT-LVL III: CPT | Mod: PBBFAC,,, | Performed by: PODIATRIST

## 2017-08-24 PROCEDURE — 1125F AMNT PAIN NOTED PAIN PRSNT: CPT | Mod: S$GLB,,, | Performed by: PODIATRIST

## 2017-08-24 PROCEDURE — 87077 CULTURE AEROBIC IDENTIFY: CPT

## 2017-08-24 PROCEDURE — 3045F PR MOST RECENT HEMOGLOBIN A1C LEVEL 7.0-9.0%: CPT | Mod: S$GLB,,, | Performed by: PODIATRIST

## 2017-08-24 PROCEDURE — 3008F BODY MASS INDEX DOCD: CPT | Mod: S$GLB,,, | Performed by: PODIATRIST

## 2017-08-24 PROCEDURE — 87070 CULTURE OTHR SPECIMN AEROBIC: CPT

## 2017-08-24 PROCEDURE — 1159F MED LIST DOCD IN RCRD: CPT | Mod: S$GLB,,, | Performed by: PODIATRIST

## 2017-08-24 NOTE — PROGRESS NOTES
Subjective:      Patient ID: Chema Ambrose is a 71 y.o. male.    Chief Complaint: Follow-up (1 week wound check )  Patient presents to clinic for a three day follow up for a wound of the Lt. 2nd toe.  Patient was seen earlier this week by Dr. Juárez due to redness, swelling, and pain from the affected digit.  A wound was noted in conjunction with localized infection.  Patient was placed on oral antibiotics and given IM ceftriaxone.  States the toe's appearance has since improved, however, still complains of sharp pain to the digit.  Currently rates pain as a 5/10.  States symptoms are exacerbated only with pressure to the digit.  Symptoms are alleviated with rest.  Has kept the previous clinic dressing clean, dry, and intact.  Relates minimal ambulation to the affected extremity.  Denies having N/V/F/C/D.  Denies any additional pedal complaints.    Review of Systems   Constitution: Negative for chills, decreased appetite, diaphoresis and fever.   Cardiovascular: Positive for leg swelling. Negative for claudication.   Skin: Positive for color change, dry skin and nail changes.   Musculoskeletal: Positive for arthritis. Negative for back pain, falls, gout and joint pain.   Neurological: Positive for numbness. Negative for paresthesias.   Psychiatric/Behavioral: Negative for altered mental status.           Objective:      Physical Exam   Constitutional: He is oriented to person, place, and time. He appears well-developed and well-nourished. No distress.   Cardiovascular:   Pulses:       Dorsalis pedis pulses are 2+ on the right side, and 2+ on the left side.        Posterior tibial pulses are 1+ on the right side, and 1+ on the left side.   CFT <3 seconds bilateral.  Pedal hair growth decreased bilateral.   No varicosities noted bilateral. Mild nonpitting edema noted to bilateral lower extremity.  Toes are cool to touch with increasing warmness proximal.       Musculoskeletal: Normal range of motion. He  exhibits edema and tenderness.   Muscle strength 5/5 in all muscle groups bilateral.  No tenderness nor crepitation with ROM of foot/ankle joints bilateral.  Pain with palpation of the Lt. 2nd toe wound.  Bilateral rectus foot type.       Neurological: He is alert and oriented to person, place, and time. He has normal strength. A sensory deficit is present.   Protective sensation per [place]-Sybil monofilament intact bilateral.    Vibratory sensation decreased bilateral.    Light touch intact bilateral.   Skin: Skin is warm and dry. Lesion noted. No abrasion, no bruising, no burn, no ecchymosis, no laceration, no petechiae and no rash noted. He is not diaphoretic. There is erythema. No cyanosis. No pallor. Nails show no clubbing.   Open wound noted to the medial aspect of the Lt. 2nd PIP joint.  Wound base is down to subQ.  Base is comprised of a mixture of fibrin and granulation.  Zoey wound is mildly edematous and erythematous. Zoey wound is devoid of fluctuance, purulence, and malodor.  Measures 1 x 0.5 x 0.2cm.             Assessment:       Encounter Diagnoses   Name Primary?    Diabetic ulcer of toe of left foot associated with type 2 diabetes mellitus, with fat layer exposed Yes    Type II diabetes mellitus with neurological manifestations     PVD (peripheral vascular disease)          Plan:       Chema was seen today for follow-up.    Diagnoses and all orders for this visit:    Diabetic ulcer of toe of left foot associated with type 2 diabetes mellitus, with fat layer exposed  -     Aerobic culture (Specify Source)  -     CULTURE, ANAEROBE  -     US Lower Extremity Arteries Bilateral; Future    Type II diabetes mellitus with neurological manifestations    PVD (peripheral vascular disease)  -     US Lower Extremity Arteries Bilateral; Future      I counseled the patient on his conditions, their implications and medical management.      No wound debridement was performed, as the patient's circulation to  bilateral LE is questionable.  Orders written for an arterial ultrasound of bilateral LE.    Wound base was covered with iodosorb.  The site was then covered with gauze, and a football dressing was applied.    Wound cultures were obtained.      Advised to continue with the current antibiotic until culture results are obtained.    Recommend rest and elevation of the affected extremity.      Advised to keep today's dressing CDI until next week's follow up visit.    Discussed minimal ambulation to the affected extremity to facilitate wound healing.    RTC in 1 week for follow up.    Shahriar Moore DPM

## 2017-08-25 ENCOUNTER — HOSPITAL ENCOUNTER (OUTPATIENT)
Dept: RADIOLOGY | Facility: HOSPITAL | Age: 72
Discharge: HOME | End: 2017-08-25
Attending: PODIATRIST
Payer: MEDICARE

## 2017-08-25 DIAGNOSIS — L97.522 DIABETIC ULCER OF TOE OF LEFT FOOT ASSOCIATED WITH TYPE 2 DIABETES MELLITUS, WITH FAT LAYER EXPOSED: ICD-10-CM

## 2017-08-25 DIAGNOSIS — I73.9 PVD (PERIPHERAL VASCULAR DISEASE): ICD-10-CM

## 2017-08-25 DIAGNOSIS — E11.621 DIABETIC ULCER OF TOE OF LEFT FOOT ASSOCIATED WITH TYPE 2 DIABETES MELLITUS, WITH FAT LAYER EXPOSED: ICD-10-CM

## 2017-08-25 PROCEDURE — 93925 LOWER EXTREMITY STUDY: CPT | Mod: TC,PO

## 2017-08-25 PROCEDURE — 93925 LOWER EXTREMITY STUDY: CPT | Mod: 26,,, | Performed by: RADIOLOGY

## 2017-08-28 DIAGNOSIS — L03.032 CELLULITIS OF TOE OF LEFT FOOT: ICD-10-CM

## 2017-08-28 DIAGNOSIS — E11.621 DIABETIC ULCER OF TOE OF LEFT FOOT ASSOCIATED WITH TYPE 2 DIABETES MELLITUS, WITH FAT LAYER EXPOSED: Primary | ICD-10-CM

## 2017-08-28 DIAGNOSIS — E11.49 TYPE II DIABETES MELLITUS WITH NEUROLOGICAL MANIFESTATIONS: ICD-10-CM

## 2017-08-28 DIAGNOSIS — L97.522 DIABETIC ULCER OF TOE OF LEFT FOOT ASSOCIATED WITH TYPE 2 DIABETES MELLITUS, WITH FAT LAYER EXPOSED: Primary | ICD-10-CM

## 2017-08-28 DIAGNOSIS — I73.9 PVD (PERIPHERAL VASCULAR DISEASE): ICD-10-CM

## 2017-08-28 LAB — BACTERIA SPEC AEROBE CULT: NORMAL

## 2017-08-28 RX ORDER — AMPICILLIN 250 MG/1
250 CAPSULE ORAL 4 TIMES DAILY
Qty: 28 CAPSULE | Refills: 0 | Status: SHIPPED | OUTPATIENT
Start: 2017-08-28 | End: 2017-08-29 | Stop reason: SDUPTHER

## 2017-08-29 ENCOUNTER — TELEPHONE (OUTPATIENT)
Dept: PODIATRY | Facility: CLINIC | Age: 72
End: 2017-08-29

## 2017-08-29 DIAGNOSIS — L03.032 CELLULITIS OF TOE OF LEFT FOOT: ICD-10-CM

## 2017-08-29 DIAGNOSIS — E11.621 DIABETIC ULCER OF TOE OF LEFT FOOT ASSOCIATED WITH TYPE 2 DIABETES MELLITUS, WITH FAT LAYER EXPOSED: Primary | ICD-10-CM

## 2017-08-29 DIAGNOSIS — L97.522 DIABETIC ULCER OF TOE OF LEFT FOOT ASSOCIATED WITH TYPE 2 DIABETES MELLITUS, WITH FAT LAYER EXPOSED: Primary | ICD-10-CM

## 2017-08-29 LAB — BACTERIA SPEC ANAEROBE CULT: NORMAL

## 2017-08-29 RX ORDER — AMPICILLIN 250 MG/1
250 CAPSULE ORAL 4 TIMES DAILY
Qty: 28 CAPSULE | Refills: 0 | Status: SHIPPED | OUTPATIENT
Start: 2017-08-29 | End: 2017-09-13 | Stop reason: SDUPTHER

## 2017-08-29 NOTE — TELEPHONE ENCOUNTER
Spoke with pharmacy and patient's wife, [facility] cannot get the Ampicillin 250 mg ,  changed order to [facility] due to med availability, Patient notified,Verbalized understanding.

## 2017-08-29 NOTE — TELEPHONE ENCOUNTER
----- Message from Noe Lester sent at 8/28/2017  3:59 PM CDT -----  Contact: Janneth de la fuente/[facility]   Janneth is calling to get some clarification on a prescription  that was sent over on   Call Back#[phone]  Thanks

## 2017-08-31 ENCOUNTER — LAB VISIT (OUTPATIENT)
Dept: LAB | Facility: HOSPITAL | Age: 72
End: 2017-08-31
Attending: INTERNAL MEDICINE
Payer: MEDICARE

## 2017-08-31 ENCOUNTER — OFFICE VISIT (OUTPATIENT)
Dept: PODIATRY | Facility: CLINIC | Age: 72
End: 2017-08-31
Payer: MEDICARE

## 2017-08-31 ENCOUNTER — OFFICE VISIT (OUTPATIENT)
Dept: ENDOCRINOLOGY | Facility: CLINIC | Age: 72
End: 2017-08-31
Payer: MEDICARE

## 2017-08-31 VITALS — BODY MASS INDEX: 30.63 KG/M2 | HEIGHT: 61 IN | WEIGHT: 162.25 LBS

## 2017-08-31 VITALS
WEIGHT: 159.31 LBS | DIASTOLIC BLOOD PRESSURE: 60 MMHG | HEART RATE: 72 BPM | BODY MASS INDEX: 30.08 KG/M2 | HEIGHT: 61 IN | SYSTOLIC BLOOD PRESSURE: 130 MMHG

## 2017-08-31 DIAGNOSIS — E11.40 TYPE 2 DIABETES MELLITUS WITH DIABETIC NEUROPATHY, WITH LONG-TERM CURRENT USE OF INSULIN: ICD-10-CM

## 2017-08-31 DIAGNOSIS — E11.49 TYPE II DIABETES MELLITUS WITH NEUROLOGICAL MANIFESTATIONS: ICD-10-CM

## 2017-08-31 DIAGNOSIS — L97.522 DIABETIC ULCER OF TOE OF LEFT FOOT ASSOCIATED WITH TYPE 2 DIABETES MELLITUS, WITH FAT LAYER EXPOSED: Primary | ICD-10-CM

## 2017-08-31 DIAGNOSIS — Z79.4 TYPE 2 DIABETES MELLITUS WITH DIABETIC NEUROPATHY, WITH LONG-TERM CURRENT USE OF INSULIN: ICD-10-CM

## 2017-08-31 DIAGNOSIS — I21.9 MYOCARDIAL INFARCTION IN RECOVERY PHASE: ICD-10-CM

## 2017-08-31 DIAGNOSIS — E78.5 HYPERLIPIDEMIA, UNSPECIFIED HYPERLIPIDEMIA TYPE: ICD-10-CM

## 2017-08-31 DIAGNOSIS — E11.59 HYPERTENSION ASSOCIATED WITH DIABETES: ICD-10-CM

## 2017-08-31 DIAGNOSIS — Z86.73 STATUS POST CVA: ICD-10-CM

## 2017-08-31 DIAGNOSIS — I15.2 HYPERTENSION ASSOCIATED WITH DIABETES: ICD-10-CM

## 2017-08-31 DIAGNOSIS — N18.30 CKD (CHRONIC KIDNEY DISEASE) STAGE 3, GFR 30-59 ML/MIN: ICD-10-CM

## 2017-08-31 DIAGNOSIS — Z79.4 TYPE 2 DIABETES MELLITUS WITH COMPLICATION, WITH LONG-TERM CURRENT USE OF INSULIN: Primary | ICD-10-CM

## 2017-08-31 DIAGNOSIS — I73.9 PVD (PERIPHERAL VASCULAR DISEASE): ICD-10-CM

## 2017-08-31 DIAGNOSIS — E11.8 TYPE 2 DIABETES MELLITUS WITH COMPLICATION, WITH LONG-TERM CURRENT USE OF INSULIN: Primary | ICD-10-CM

## 2017-08-31 DIAGNOSIS — E11.621 DIABETIC ULCER OF TOE OF LEFT FOOT ASSOCIATED WITH TYPE 2 DIABETES MELLITUS, WITH FAT LAYER EXPOSED: Primary | ICD-10-CM

## 2017-08-31 LAB
ESTIMATED AVG GLUCOSE: 183 MG/DL
HBA1C MFR BLD HPLC: 8 %

## 2017-08-31 PROCEDURE — 1126F AMNT PAIN NOTED NONE PRSNT: CPT | Mod: S$GLB,,, | Performed by: NURSE PRACTITIONER

## 2017-08-31 PROCEDURE — 99213 OFFICE O/P EST LOW 20 MIN: CPT | Mod: S$GLB,,, | Performed by: PODIATRIST

## 2017-08-31 PROCEDURE — 1125F AMNT PAIN NOTED PAIN PRSNT: CPT | Mod: S$GLB,,, | Performed by: PODIATRIST

## 2017-08-31 PROCEDURE — 3074F SYST BP LT 130 MM HG: CPT | Mod: S$GLB,,, | Performed by: PODIATRIST

## 2017-08-31 PROCEDURE — 3045F PR MOST RECENT HEMOGLOBIN A1C LEVEL 7.0-9.0%: CPT | Mod: S$GLB,,, | Performed by: NURSE PRACTITIONER

## 2017-08-31 PROCEDURE — 36415 COLL VENOUS BLD VENIPUNCTURE: CPT | Mod: PO

## 2017-08-31 PROCEDURE — 99214 OFFICE O/P EST MOD 30 MIN: CPT | Mod: S$GLB,,, | Performed by: NURSE PRACTITIONER

## 2017-08-31 PROCEDURE — 1159F MED LIST DOCD IN RCRD: CPT | Mod: S$GLB,,, | Performed by: NURSE PRACTITIONER

## 2017-08-31 PROCEDURE — 3045F PR MOST RECENT HEMOGLOBIN A1C LEVEL 7.0-9.0%: CPT | Mod: S$GLB,,, | Performed by: PODIATRIST

## 2017-08-31 PROCEDURE — 99999 PR PBB SHADOW E&M-EST. PATIENT-LVL IV: CPT | Mod: PBBFAC,,, | Performed by: NURSE PRACTITIONER

## 2017-08-31 PROCEDURE — 99499 UNLISTED E&M SERVICE: CPT | Mod: S$GLB,,, | Performed by: PODIATRIST

## 2017-08-31 PROCEDURE — 3075F SYST BP GE 130 - 139MM HG: CPT | Mod: S$GLB,,, | Performed by: NURSE PRACTITIONER

## 2017-08-31 PROCEDURE — 3078F DIAST BP <80 MM HG: CPT | Mod: S$GLB,,, | Performed by: NURSE PRACTITIONER

## 2017-08-31 PROCEDURE — 3008F BODY MASS INDEX DOCD: CPT | Mod: S$GLB,,, | Performed by: PODIATRIST

## 2017-08-31 PROCEDURE — 3008F BODY MASS INDEX DOCD: CPT | Mod: S$GLB,,, | Performed by: NURSE PRACTITIONER

## 2017-08-31 PROCEDURE — 3078F DIAST BP <80 MM HG: CPT | Mod: S$GLB,,, | Performed by: PODIATRIST

## 2017-08-31 PROCEDURE — 1159F MED LIST DOCD IN RCRD: CPT | Mod: S$GLB,,, | Performed by: PODIATRIST

## 2017-08-31 PROCEDURE — 83036 HEMOGLOBIN GLYCOSYLATED A1C: CPT

## 2017-08-31 PROCEDURE — 99999 PR PBB SHADOW E&M-EST. PATIENT-LVL II: CPT | Mod: PBBFAC,,, | Performed by: PODIATRIST

## 2017-08-31 NOTE — PROGRESS NOTES
Subjective:      Patient ID: Chema Ambrose is a 71 y.o. male.    Chief Complaint: Diabetes Mellitus (PCP Radha 8/24/17  A1C  4/28/17  8.9) and Wound Care (Left foot)  Patient presents to clinic for a 1 one week wound check of the Lt. Foot.  Relates continued pain to the Lt. 2nd toe wound.  Describes as sharp and rates as a 5/10.  States symptoms are exacerbated with pressure to the toe and are even present while at rest.  Has kept the previous clinic dressing clean, dry, and intact x 1 week.  Relates minimal ambulation, as he has been attempting to rest the extremity.  Has yet to start the prescribed course of ampicillin.  Has contacted his vascular doctor and awaiting an appointment.  Denies having N/V/F/C/D.  Denies any additional pedal complaints.    Review of Systems   Constitution: Negative for chills, decreased appetite, diaphoresis and fever.   Cardiovascular: Positive for leg swelling. Negative for claudication.   Skin: Positive for color change, dry skin and nail changes.   Musculoskeletal: Positive for arthritis. Negative for back pain, falls, gout and joint pain.   Neurological: Positive for numbness. Negative for paresthesias.   Psychiatric/Behavioral: Negative for altered mental status.           Objective:      Physical Exam   Constitutional: He is oriented to person, place, and time. He appears well-developed and well-nourished. No distress.   Cardiovascular:   Pulses:       Dorsalis pedis pulses are 2+ on the right side, and 2+ on the left side.        Posterior tibial pulses are 1+ on the right side, and 1+ on the left side.   CFT 3 seconds bilateral with the exception of 4 seconds to the Lt. 2nd toe.  Pedal hair growth decreased bilateral.   No varicosities noted bilateral. Mild nonpitting edema noted to bilateral lower extremity.  Toes are cool to touch with increasing warmness proximal.       Musculoskeletal: Normal range of motion. He exhibits edema and tenderness.   Muscle strength 5/5 in  all muscle groups bilateral.  No tenderness nor crepitation with ROM of foot/ankle joints bilateral.  Pain with palpation of the Lt. 2nd toe wound.  Bilateral rectus foot type.       Neurological: He is alert and oriented to person, place, and time. He has normal strength. A sensory deficit is present.   Protective sensation per [place]-Sybil monofilament intact bilateral.    Vibratory sensation decreased bilateral.    Light touch intact bilateral.   Skin: Skin is warm and dry. Lesion noted. No abrasion, no bruising, no burn, no ecchymosis, no laceration, no petechiae and no rash noted. He is not diaphoretic. There is erythema. No cyanosis. No pallor. Nails show no clubbing.   Open wound noted to the medial aspect of the Lt. 2nd PIP joint.  Wound base is down to subQ.  Base is comprised of a mixture of fibrin and granulation.  Zoey wound is mildly edematous and erythematous. Zoey wound is devoid of fluctuance, purulence, and malodor.  Measures 1 x 0.5 x 0.2cm.  Unchanged in comparison to the previous exam.             Assessment:       Encounter Diagnoses   Name Primary?    Diabetic ulcer of toe of left foot associated with type 2 diabetes mellitus, with fat layer exposed Yes    Type II diabetes mellitus with neurological manifestations     PVD (peripheral vascular disease)          Plan:       Chema was seen today for diabetes mellitus and wound care.    Diagnoses and all orders for this visit:    Diabetic ulcer of toe of left foot associated with type 2 diabetes mellitus, with fat layer exposed    Type II diabetes mellitus with neurological manifestations    PVD (peripheral vascular disease)      I counseled the patient on his conditions, their implications and medical management.    Reviewed ultrasound of bilateral LE, which was indicative of moderate to severe occlusive disease.    Referral previously written for evaluation by patient's vascular specialist.      No wound debridement was performed due to  patient's compromised circulation.    Wound base was covered with danika.  The site was then covered with gauze, and a football dressing was applied.    Advised to start taking ampicillin today, as infection in the digit has improved little when compared to the last exam.    Recommend rest and elevation of the affected extremity.      Advised to keep today's dressing CDI until next week's follow up visit.    Discussed minimal ambulation to the affected extremity to facilitate wound healing.    RTC in 1 week for follow up.    Shahriar Moore DPM

## 2017-08-31 NOTE — PROGRESS NOTES
Subjective:      Patient ID: Chema Ambrose is a 71 y.o. male.    Chief Complaint:  Routine DM f/u     History of Present Illness  CHIEF COMPLAINT: Type 2 diabetes    Pt is a 70 y/o wm with Type 2 DM since at least 2004, as well as chronic conditions pending review including HTN, HLP, neuropathy, and Osteopenia. Pt with limited cognitive abilities, poor historian. .  .      Interim Events: Admitted to [place] July 4, found down on floor at home--basilar and temporal (sounds like per fly member) CVA,  And then MI in hospital while attempting stent.  Also sounds like several prior MI revealed.   Pt in ICU 3 weeks,  Rehab 2 weeks.  No logs.  States d/c from hospital on lantus 14 units bid.  Drastic change from  Near 100 units of N&R with breakfast, and 25N15 Reg on pre admit.  Also with L foot ulcer under care of Dr. Moore.     -150  -250.        Diabetes Flow Sheet:   Diabetes Medications: 75 NPH + 25 Regular with breakfast,   25 NPH and 15 Regular with Supper.    Prior Regimen---  Novolin 70/30--80 units before breakfast, 35 before supper.     Novolog 70/30  30 with breakfast, 40 with supper        suspected pt missing most shots. 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 Left CEA    SOCIAL HISTORY: He does use chewing tobacco. No alcohol use    FAMILY HISTORY: Diabetes      Review of Systems   Constitutional: Negative for activity change, appetite change, fatigue and unexpected weight change.   HENT: Negative for hearing loss and trouble swallowing.    Eyes: Negative for photophobia and visual  disturbance.        Eye exam--States 2016--outside eye doc   Respiratory: Negative for cough and shortness of breath.    Cardiovascular: Negative for chest pain, palpitations and leg swelling.   Gastrointestinal: Positive for constipation (occassional ). Negative for diarrhea.   Endocrine: Negative for polydipsia and polyuria (polyuria much improved. ).   Genitourinary: Negative for difficulty urinating and frequency.   Musculoskeletal: Negative for arthralgias and joint swelling.   Skin: Negative for rash and wound.   Neurological: Positive for numbness (feet). Negative for weakness.   Psychiatric/Behavioral: Negative for agitation and sleep disturbance. The patient is not nervous/anxious.        Objective:   Physical Exam   Constitutional: He is oriented to person, place, and time. He appears well-developed and well-nourished. No distress.   Appears approx age though fragile, borderline disheveled.    HENT:   Head: Normocephalic and atraumatic.   Nose: Nose normal.   Mouth/Throat: Oropharynx is clear and moist.   Eyes: Conjunctivae and EOM are normal. Pupils are equal, round, and reactive to light.   Neck: Normal range of motion. Neck supple. No tracheal deviation present. No thyromegaly present.   Cardiovascular: Normal rate and regular rhythm.  Exam reveals no friction rub.    Murmur heard.  Pulmonary/Chest: Effort normal and breath sounds normal. No respiratory distress. He has no wheezes.   Musculoskeletal: Normal range of motion. He exhibits no edema.   L boot      Lymphadenopathy:     He has no cervical adenopathy.   Neurological: He is alert and oriented to person, place, and time. He has normal reflexes. No cranial nerve deficit. He exhibits normal muscle tone. Coordination normal.   Skin: Skin is warm and dry. No rash noted. He is not diaphoretic. No erythema.   Psychiatric: He has a normal mood and affect. His behavior is normal. Judgment and thought content normal.     Vital Signs  Pulse: 72  BP:  "130/60  Pain Score: 0-No pain  Height and Weight  Height: 5' 1" (154.9 cm)  Weight: 72.3 kg (159 lb 4.5 oz)  BSA (Calculated - sq m): 1.76 sq meters  BMI (Calculated): 30.2  Weight in (lb) to have BMI = 25: 132]      Lab Review:     Chemistry        Component Value Date/Time     08/16/2017 1320    K 4.5 08/16/2017 1320     08/16/2017 1320    CO2 27 08/16/2017 1320    BUN 16 08/16/2017 1320    CREATININE 1.3 08/16/2017 1320     (H) 08/16/2017 1320        Component Value Date/Time    CALCIUM 9.5 08/16/2017 1320    ALKPHOS 146 (H) 08/16/2017 1320    AST 33 08/16/2017 1320    ALT 49 (H) 08/16/2017 1320    BILITOT 0.5 08/16/2017 1320        Hemoglobin A1C   Date Value Ref Range Status   04/28/2017 8.9 (H) 4.5 - 6.2 % Final     Comment:     According to ADA guidelines, hemoglobin A1C <7.0% represents  optimal control in non-pregnant diabetic patients.  Different  metrics may apply to specific populations.   Standards of Medical Care in Diabetes - 2016.  For the purpose of screening for the presence of diabetes:  <5.7%     Consistent with the absence of diabetes  5.7-6.4%  Consistent with increasing risk for diabetes   (prediabetes)  >or=6.5%  Consistent with diabetes  Currently no consensus exists for use of hemoglobin A1C  for diagnosis of diabetes for children.     12/15/2016 11.6 (H) 0.0 - 5.6 % Final     Comment:     Reference Interval:  5.0 - 5.6 Normal   5.7 - 6.4 High Risk   > 6.5 Diabetic    Hgb A1c results are standardized based on the (NGSP) National   Glycohemoglobin Standardization Program.    Hemoglobin A1C levels are related to mean serum/plasma glucose   during the preceding 2-3 months.        12/15/2016 11.2 (H) 4.5 - 6.2 % Final     Comment:     According to ADA guidelines, hemoglobin A1C <7.0% represents  optimal control in non-pregnant diabetic patients.  Different  metrics may apply to specific populations.   Standards of Medical Care in Diabetes - 2016.  For the purpose of screening " for the presence of diabetes:  <5.7%     Consistent with the absence of diabetes  5.7-6.4%  Consistent with increasing risk for diabetes   (prediabetes)  >or=6.5%  Consistent with diabetes  Currently no consensus exists for use of hemoglobin A1C  for diagnosis of diabetes for children.       Lab Results   Component Value Date    LDLCALC 58.0 (L) 12/16/2016     Lab Results   Component Value Date    TSH 2.300 12/15/2016     Component      Latest Ref Rng & Units 12/15/2016   Microalbum.,U,Random      ug/mL 874.0   Creatinine, Random Ur      23.0 - 375.0 mg/dL 114.0   Microalb Creat Ratio      0.0 - 30.0 ug/mg 766.7 (H)       Assessment:     1. Type 2 diabetes mellitus with complication, with long-term current use of insulin  Hemoglobin A1c  Chronic-uncontrolled-see plan    2. CKD (chronic kidney disease) stage 3, GFR 30-59 ml/min  -chronic-stable-optimize glucose, bp    3. Hyperlipidemia, unspecified hyperlipidemia type  -chronic-stable-no no ace or arb noted.     4. Hypertension associated with diabetes  -chronic-stable-davis atorvastatin 80 mg    5. Myocardial infarction in recovery phase     6. Status post CVA            Plan:   Suspect insulin needs have drastically change, but on 14 lantus bid,  He is getting no prandial insulin coverage.  I must have glucose logs to optimize glucose control.  Will evaluate logs on current regimin and convert as appropriate to split mix.        8/31/17  1 pm 9/6--log review.      4 mo with fasting cmp, lipids, a1c, urine m/c prior

## 2017-09-06 ENCOUNTER — OFFICE VISIT (OUTPATIENT)
Dept: PODIATRY | Facility: CLINIC | Age: 72
End: 2017-09-06
Payer: MEDICARE

## 2017-09-06 ENCOUNTER — TELEPHONE (OUTPATIENT)
Dept: PODIATRY | Facility: CLINIC | Age: 72
End: 2017-09-06

## 2017-09-06 ENCOUNTER — OFFICE VISIT (OUTPATIENT)
Dept: ENDOCRINOLOGY | Facility: CLINIC | Age: 72
End: 2017-09-06
Payer: MEDICARE

## 2017-09-06 VITALS
HEIGHT: 61 IN | SYSTOLIC BLOOD PRESSURE: 120 MMHG | WEIGHT: 159 LBS | BODY MASS INDEX: 30.02 KG/M2 | DIASTOLIC BLOOD PRESSURE: 62 MMHG

## 2017-09-06 VITALS — HEIGHT: 61 IN | BODY MASS INDEX: 30.14 KG/M2 | WEIGHT: 159.63 LBS

## 2017-09-06 DIAGNOSIS — E11.49 TYPE II DIABETES MELLITUS WITH NEUROLOGICAL MANIFESTATIONS: ICD-10-CM

## 2017-09-06 DIAGNOSIS — L97.522 DIABETIC ULCER OF TOE OF LEFT FOOT ASSOCIATED WITH TYPE 2 DIABETES MELLITUS, WITH FAT LAYER EXPOSED: Primary | ICD-10-CM

## 2017-09-06 DIAGNOSIS — E11.621 DIABETIC ULCER OF TOE OF LEFT FOOT ASSOCIATED WITH TYPE 2 DIABETES MELLITUS, WITH FAT LAYER EXPOSED: Primary | ICD-10-CM

## 2017-09-06 DIAGNOSIS — I73.9 PVD (PERIPHERAL VASCULAR DISEASE): ICD-10-CM

## 2017-09-06 DIAGNOSIS — Z79.4 TYPE 2 DIABETES MELLITUS WITH COMPLICATION, WITH LONG-TERM CURRENT USE OF INSULIN: Primary | ICD-10-CM

## 2017-09-06 DIAGNOSIS — Z51.81 MEDICATION MONITORING ENCOUNTER: ICD-10-CM

## 2017-09-06 DIAGNOSIS — E11.8 TYPE 2 DIABETES MELLITUS WITH COMPLICATION, WITH LONG-TERM CURRENT USE OF INSULIN: Primary | ICD-10-CM

## 2017-09-06 PROCEDURE — 3045F PR MOST RECENT HEMOGLOBIN A1C LEVEL 7.0-9.0%: CPT | Mod: S$GLB,,, | Performed by: PODIATRIST

## 2017-09-06 PROCEDURE — 3074F SYST BP LT 130 MM HG: CPT | Mod: S$GLB,,, | Performed by: NURSE PRACTITIONER

## 2017-09-06 PROCEDURE — 1159F MED LIST DOCD IN RCRD: CPT | Mod: S$GLB,,, | Performed by: NURSE PRACTITIONER

## 2017-09-06 PROCEDURE — 99213 OFFICE O/P EST LOW 20 MIN: CPT | Mod: S$GLB,,, | Performed by: NURSE PRACTITIONER

## 2017-09-06 PROCEDURE — 3078F DIAST BP <80 MM HG: CPT | Mod: S$GLB,,, | Performed by: PODIATRIST

## 2017-09-06 PROCEDURE — 3008F BODY MASS INDEX DOCD: CPT | Mod: S$GLB,,, | Performed by: NURSE PRACTITIONER

## 2017-09-06 PROCEDURE — 3078F DIAST BP <80 MM HG: CPT | Mod: S$GLB,,, | Performed by: NURSE PRACTITIONER

## 2017-09-06 PROCEDURE — 3008F BODY MASS INDEX DOCD: CPT | Mod: S$GLB,,, | Performed by: PODIATRIST

## 2017-09-06 PROCEDURE — 1126F AMNT PAIN NOTED NONE PRSNT: CPT | Mod: S$GLB,,, | Performed by: PODIATRIST

## 2017-09-06 PROCEDURE — 1125F AMNT PAIN NOTED PAIN PRSNT: CPT | Mod: S$GLB,,, | Performed by: NURSE PRACTITIONER

## 2017-09-06 PROCEDURE — 3045F PR MOST RECENT HEMOGLOBIN A1C LEVEL 7.0-9.0%: CPT | Mod: S$GLB,,, | Performed by: NURSE PRACTITIONER

## 2017-09-06 PROCEDURE — 99499 UNLISTED E&M SERVICE: CPT | Mod: S$GLB,,, | Performed by: PODIATRIST

## 2017-09-06 PROCEDURE — 99213 OFFICE O/P EST LOW 20 MIN: CPT | Mod: S$GLB,,, | Performed by: PODIATRIST

## 2017-09-06 PROCEDURE — 3074F SYST BP LT 130 MM HG: CPT | Mod: S$GLB,,, | Performed by: PODIATRIST

## 2017-09-06 PROCEDURE — 99999 PR PBB SHADOW E&M-EST. PATIENT-LVL III: CPT | Mod: PBBFAC,,, | Performed by: NURSE PRACTITIONER

## 2017-09-06 PROCEDURE — 1159F MED LIST DOCD IN RCRD: CPT | Mod: S$GLB,,, | Performed by: PODIATRIST

## 2017-09-06 PROCEDURE — 99999 PR PBB SHADOW E&M-EST. PATIENT-LVL III: CPT | Mod: PBBFAC,,, | Performed by: PODIATRIST

## 2017-09-06 NOTE — PROGRESS NOTES
Subjective:      Patient ID: Chema Ambrose is a 71 y.o. male.    Chief Complaint:  Routine DM f/u --glucose log review.      History of Present Illness  CHIEF COMPLAINT: Type 2 diabetes    Pt is a 70 y/o wm with Type 2 DM since at least 2004, as well as chronic conditions pending review including HTN, HLP, neuropathy, and Osteopenia. Pt with limited cognitive abilities, poor historian. .  .      Interim Events: Admitted to [place] July 4, found down on floor at home--basilar and temporal (sounds like per fly member) CVA,  And then MI in hospital while attempting stent.  Also sounds like several prior MI revealed.   Pt in ICU 3 weeks,  Rehab 2 weeks.  No logs.  States d/c from hospital on lantus 14 units bid.  Drastic change from  Near 100 units of N&R with breakfast, and 25N15 Reg on pre admit.  Also with L foot ulcer under care of Dr. Moore.     Pt back today so I could review glucose logs on 14 lantus BID.  Note prelunch, supper and bedtime glucose elevations.          Diabetes Flow Sheet:   Diabetes Medications:                                  lantus 14 bid--per hosp d/c                                  75 NPH + 25 Regular with breakfast,   25 NPH and 15 Regular with Supper.    Prior Regimen---  Novolin 70/30--80 units before breakfast, 35 before supper.     Novolog 70/30  30 with breakfast, 40 with supper        suspected pt missing most shots. 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 Left CEA    SOCIAL HISTORY: He does use chewing tobacco. No alcohol use    FAMILY HISTORY: Diabetes      Review of Systems  "      Objective:   Physical Exam     Vital Signs  BP: 120/62  BP Location: Right arm  Patient Position: Sitting  Pain Score:   2  Pain Loc: Foot (left)  Height and Weight  Height: 5' 1" (154.9 cm)  Weight: 72.1 kg (159 lb)  BSA (Calculated - sq m): 1.76 sq meters  BMI (Calculated): 30.1  Weight in (lb) to have BMI = 25: 132]      Lab Review:     Chemistry        Component Value Date/Time     08/16/2017 1320    K 4.5 08/16/2017 1320     08/16/2017 1320    CO2 27 08/16/2017 1320    BUN 16 08/16/2017 1320    CREATININE 1.3 08/16/2017 1320     (H) 08/16/2017 1320        Component Value Date/Time    CALCIUM 9.5 08/16/2017 1320    ALKPHOS 146 (H) 08/16/2017 1320    AST 33 08/16/2017 1320    ALT 49 (H) 08/16/2017 1320    BILITOT 0.5 08/16/2017 1320        Hemoglobin A1C   Date Value Ref Range Status   08/31/2017 8.0 (H) 4.0 - 5.6 % Final     Comment:     According to ADA guidelines, hemoglobin A1c <7.0% represents  optimal control in non-pregnant diabetic patients. Different  metrics may apply to specific patient populations.   Standards of Medical Care in Diabetes-2016.  For the purpose of screening for the presence of diabetes:  <5.7%     Consistent with the absence of diabetes  5.7-6.4%  Consistent with increasing risk for diabetes   (prediabetes)  >or=6.5%  Consistent with diabetes  Currently, no consensus exists for use of hemoglobin A1c  for diagnosis of diabetes for children.  This Hemoglobin A1c assay has significant interference with fetal   hemoglobin   (HbF). The results are invalid for patients with abnormal amounts of   HbF,   including those with known Hereditary Persistence   of Fetal Hemoglobin. Heterozygous hemoglobin variants (HbAS, HbAC,   HbAD, HbAE, HbA2) do not significantly interfere with this assay;   however, presence of multiple variants in a sample may impact the %   interference.     04/28/2017 8.9 (H) 4.5 - 6.2 % Final     Comment:     According to ADA guidelines, hemoglobin " A1C <7.0% represents  optimal control in non-pregnant diabetic patients.  Different  metrics may apply to specific populations.   Standards of Medical Care in Diabetes - 2016.  For the purpose of screening for the presence of diabetes:  <5.7%     Consistent with the absence of diabetes  5.7-6.4%  Consistent with increasing risk for diabetes   (prediabetes)  >or=6.5%  Consistent with diabetes  Currently no consensus exists for use of hemoglobin A1C  for diagnosis of diabetes for children.     12/15/2016 11.6 (H) 0.0 - 5.6 % Final     Comment:     Reference Interval:  5.0 - 5.6 Normal   5.7 - 6.4 High Risk   > 6.5 Diabetic    Hgb A1c results are standardized based on the (NGSP) National   Glycohemoglobin Standardization Program.    Hemoglobin A1C levels are related to mean serum/plasma glucose   during the preceding 2-3 months.          Lab Results   Component Value Date    LDLCALC 58.0 (L) 12/16/2016     Lab Results   Component Value Date    TSH 2.300 12/15/2016     Component      Latest Ref Rng & Units 12/15/2016   Microalbum.,U,Random      ug/mL 874.0   Creatinine, Random Ur      23.0 - 375.0 mg/dL 114.0   Microalb Creat Ratio      0.0 - 30.0 ug/mg 766.7 (H)       Assessment:     1. Type 2 diabetes mellitus with complication, with long-term current use of insulin     2. Medication monitoring encounter          Plan:   D/c lantus   Restart N & R--will start at 15 N plus 5 regular 30 min prior breakfast and supper.    Cont checking glucoses QID   See in 1 week.     Orders 9/16/17  1 pm 9/13 --log review.      4 mo with fasting cmp, lipids, a1c, urine m/c prior

## 2017-09-07 NOTE — PROGRESS NOTES
Subjective:      Patient ID: Chema Ambrose is a 71 y.o. male.    Chief Complaint: Wound Care (Left foot) and Diabetes Mellitus (PCP Radha 8/16/17 A1C 8/31/17  8.0)  Patient presents to clinic for a 1 one week wound check of the Lt. Foot.  Relates continued pain to the Lt. 2nd toe wound with pressure and occasionally while at rest.  Has kept the previous clinic dressing clean, dry and intact x 1 week.  Has called his Cardiologist for an appointment, however, this has yet to be arranged.  Has been ambulating minimally in a postoperative shoe. Denies having N/V/F/C/D.  Denies any additional pedal complaints.    Review of Systems   Constitution: Negative for chills, decreased appetite, diaphoresis and fever.   Cardiovascular: Positive for leg swelling. Negative for claudication.   Skin: Positive for color change, dry skin and nail changes.   Musculoskeletal: Positive for arthritis and myalgias. Negative for back pain, falls, gout and joint pain.   Neurological: Positive for numbness. Negative for paresthesias.   Psychiatric/Behavioral: Negative for altered mental status.           Objective:      Physical Exam   Constitutional: He is oriented to person, place, and time. He appears well-developed and well-nourished. No distress.   Cardiovascular:   Pulses:       Dorsalis pedis pulses are 1+ on the right side, and 1+ on the left side.        Posterior tibial pulses are 1+ on the right side, and 1+ on the left side.   CFT 3 seconds bilateral with the exception of 4 seconds to the Lt. 2nd toe.  Pedal hair growth decreased bilateral.   No varicosities noted bilateral. Mild nonpitting edema noted to bilateral lower extremity.  Toes are cool to touch with increasing warmness proximal.       Musculoskeletal: Normal range of motion. He exhibits edema and tenderness.   Muscle strength 5/5 in all muscle groups bilateral.  No tenderness nor crepitation with ROM of foot/ankle joints bilateral.  Pain with palpation of the Lt.  2nd toe wound.  Bilateral rectus foot type.       Neurological: He is alert and oriented to person, place, and time. He has normal strength. A sensory deficit is present.   Protective sensation per [place] monofilament intact bilateral.    Vibratory sensation decreased bilateral.    Light touch intact bilateral.   Skin: Skin is warm and dry. Lesion noted. No abrasion, no bruising, no burn, no ecchymosis, no laceration, no petechiae and no rash noted. He is not diaphoretic. No cyanosis or erythema. No pallor. Nails show no clubbing.   Open wound noted to the medial aspect of the Lt. 2nd PIP joint.  Wound base is down to subQ.  Base is comprised of fibrinous tissue.  Zoey wound is mildly edematous.  Dusky appearance noted to the entire digit with today's exam.  Zoey wound is devoid of erythema, fluctuance, purulence, and malodor.  Measures 1 x 0.5 x 0.2cm.               Assessment:       Encounter Diagnoses   Name Primary?    Diabetic ulcer of toe of left foot associated with type 2 diabetes mellitus, with fat layer exposed Yes    Type II diabetes mellitus with neurological manifestations     PVD (peripheral vascular disease)          Plan:       Chema was seen today for wound care and diabetes mellitus.    Diagnoses and all orders for this visit:    Diabetic ulcer of toe of left foot associated with type 2 diabetes mellitus, with fat layer exposed    Type II diabetes mellitus with neurological manifestations    PVD (peripheral vascular disease)      I counseled the patient on his conditions, their implications and medical management.    Staff contacted his cardiologist, Dr. Grajeda, to set up appointment as ischemic changes are noted to the Lt. 2nd toe in conjunction with a non healing wound.    No wound debridement was performed due to patient's compromised circulation.    Wound base was covered with iodosorb.  The site was then covered with non adherent gauze, and a football dressing was  applied.    Advised to finish the current course of ampicillin.    Recommend rest and elevation of the affected extremity.      Advised to keep today's dressing CDI until next week's follow up visit.    Discussed minimal ambulation to the affected extremity to facilitate wound healing.    RTC in 1 week for continued wound care with Dr. Juárez in my absence.    Shahriar Moore DPM

## 2017-09-13 ENCOUNTER — OFFICE VISIT (OUTPATIENT)
Dept: ENDOCRINOLOGY | Facility: CLINIC | Age: 72
End: 2017-09-13
Payer: MEDICARE

## 2017-09-13 ENCOUNTER — OFFICE VISIT (OUTPATIENT)
Dept: PODIATRY | Facility: CLINIC | Age: 72
End: 2017-09-13
Payer: MEDICARE

## 2017-09-13 ENCOUNTER — TELEPHONE (OUTPATIENT)
Dept: PODIATRY | Facility: CLINIC | Age: 72
End: 2017-09-13

## 2017-09-13 ENCOUNTER — TELEPHONE (OUTPATIENT)
Dept: VASCULAR SURGERY | Facility: CLINIC | Age: 72
End: 2017-09-13

## 2017-09-13 VITALS — BODY MASS INDEX: 30.01 KG/M2 | WEIGHT: 158.94 LBS | HEIGHT: 61 IN

## 2017-09-13 VITALS — HEART RATE: 58 BPM | DIASTOLIC BLOOD PRESSURE: 60 MMHG | SYSTOLIC BLOOD PRESSURE: 138 MMHG

## 2017-09-13 DIAGNOSIS — Z51.81 MEDICATION MONITORING ENCOUNTER: ICD-10-CM

## 2017-09-13 DIAGNOSIS — L97.509 TYPE 2 DIABETES MELLITUS WITH FOOT ULCER, WITH LONG-TERM CURRENT USE OF INSULIN: ICD-10-CM

## 2017-09-13 DIAGNOSIS — Z79.4 TYPE 2 DIABETES MELLITUS WITH COMPLICATION, WITH LONG-TERM CURRENT USE OF INSULIN: Primary | ICD-10-CM

## 2017-09-13 DIAGNOSIS — E11.8 TYPE 2 DIABETES MELLITUS WITH COMPLICATION, WITH LONG-TERM CURRENT USE OF INSULIN: Primary | ICD-10-CM

## 2017-09-13 DIAGNOSIS — L97.522 DIABETIC ULCER OF TOE OF LEFT FOOT ASSOCIATED WITH TYPE 2 DIABETES MELLITUS, WITH FAT LAYER EXPOSED: ICD-10-CM

## 2017-09-13 DIAGNOSIS — L03.032 CELLULITIS OF TOE OF LEFT FOOT: ICD-10-CM

## 2017-09-13 DIAGNOSIS — E11.621 TYPE 2 DIABETES MELLITUS WITH FOOT ULCER, WITH LONG-TERM CURRENT USE OF INSULIN: ICD-10-CM

## 2017-09-13 DIAGNOSIS — Z79.4 INSULIN LONG-TERM USE: ICD-10-CM

## 2017-09-13 DIAGNOSIS — I73.9 PVD (PERIPHERAL VASCULAR DISEASE): Primary | ICD-10-CM

## 2017-09-13 DIAGNOSIS — Z79.4 TYPE 2 DIABETES MELLITUS WITH FOOT ULCER, WITH LONG-TERM CURRENT USE OF INSULIN: ICD-10-CM

## 2017-09-13 DIAGNOSIS — E11.621 DIABETIC ULCER OF TOE OF LEFT FOOT ASSOCIATED WITH TYPE 2 DIABETES MELLITUS, WITH FAT LAYER EXPOSED: ICD-10-CM

## 2017-09-13 PROCEDURE — 99214 OFFICE O/P EST MOD 30 MIN: CPT | Mod: S$GLB,,,

## 2017-09-13 PROCEDURE — 3008F BODY MASS INDEX DOCD: CPT | Mod: S$GLB,,, | Performed by: NURSE PRACTITIONER

## 2017-09-13 PROCEDURE — 3078F DIAST BP <80 MM HG: CPT | Mod: S$GLB,,, | Performed by: NURSE PRACTITIONER

## 2017-09-13 PROCEDURE — 3078F DIAST BP <80 MM HG: CPT | Mod: S$GLB,,,

## 2017-09-13 PROCEDURE — 1159F MED LIST DOCD IN RCRD: CPT | Mod: S$GLB,,, | Performed by: NURSE PRACTITIONER

## 2017-09-13 PROCEDURE — 99213 OFFICE O/P EST LOW 20 MIN: CPT | Mod: S$GLB,,, | Performed by: NURSE PRACTITIONER

## 2017-09-13 PROCEDURE — 3045F PR MOST RECENT HEMOGLOBIN A1C LEVEL 7.0-9.0%: CPT | Mod: S$GLB,,,

## 2017-09-13 PROCEDURE — 1125F AMNT PAIN NOTED PAIN PRSNT: CPT | Mod: S$GLB,,,

## 2017-09-13 PROCEDURE — 99999 PR PBB SHADOW E&M-EST. PATIENT-LVL III: CPT | Mod: PBBFAC,,,

## 2017-09-13 PROCEDURE — 3074F SYST BP LT 130 MM HG: CPT | Mod: S$GLB,,,

## 2017-09-13 PROCEDURE — 3008F BODY MASS INDEX DOCD: CPT | Mod: S$GLB,,,

## 2017-09-13 PROCEDURE — 99499 UNLISTED E&M SERVICE: CPT | Mod: S$GLB,,,

## 2017-09-13 PROCEDURE — 3075F SYST BP GE 130 - 139MM HG: CPT | Mod: S$GLB,,, | Performed by: NURSE PRACTITIONER

## 2017-09-13 PROCEDURE — 1159F MED LIST DOCD IN RCRD: CPT | Mod: S$GLB,,,

## 2017-09-13 PROCEDURE — 99999 PR PBB SHADOW E&M-EST. PATIENT-LVL III: CPT | Mod: PBBFAC,,, | Performed by: NURSE PRACTITIONER

## 2017-09-13 PROCEDURE — 3045F PR MOST RECENT HEMOGLOBIN A1C LEVEL 7.0-9.0%: CPT | Mod: S$GLB,,, | Performed by: NURSE PRACTITIONER

## 2017-09-13 RX ORDER — AMPICILLIN 250 MG/1
250 CAPSULE ORAL 4 TIMES DAILY
Qty: 28 CAPSULE | Refills: 0 | Status: SHIPPED | OUTPATIENT
Start: 2017-09-13 | End: 2017-09-14

## 2017-09-13 RX ORDER — AMOXICILLIN AND CLAVULANATE POTASSIUM 875; 125 MG/1; MG/1
1 TABLET, FILM COATED ORAL 2 TIMES DAILY
Qty: 20 TABLET | Refills: 0 | Status: SHIPPED | OUTPATIENT
Start: 2017-09-13 | End: 2017-09-29 | Stop reason: SDUPTHER

## 2017-09-13 NOTE — TELEPHONE ENCOUNTER
----- Message from Maricarmen Sam sent at 9/13/2017  3:29 PM CDT -----  Contact: [facility]  [facility] calling in regards to the Ampicillin that was E-scribed today. It isn't available anymore except in IV form. She wants to know if the Doctor wants to use a different antibiotic. Please advise.  Call back  [facility] [place] - [address] [address]  Phone: [phone] Fax: [phone]

## 2017-09-13 NOTE — PROGRESS NOTES
Subjective:      Patient ID: Chema Ambrose is a 71 y.o. male.    Chief Complaint:  Routine DM f/u --glucose log review.      History of Present Illness  CHIEF COMPLAINT: Type 2 diabetes    Pt is a 70 y/o wm with Type 2 DM since at least 2004, as well as chronic conditions pending review including HTN, HLP, neuropathy, and Osteopenia. Pt with limited cognitive abilities, poor historian. .  .      : Admitted to [facility] July 4, found down on floor at home--basilar and temporal (sounds like per fly member) CVA,  And then MI in hospital while attempting stent.  Also sounds like several prior MI revealed.   Pt in ICU 3 weeks,  Rehab 2 weeks.  No logs.  States d/c from hospital on lantus 14 units bid.  Drastic change from  Near 100 units of N&R with breakfast, and 25N15 Reg on pre admit.  Also with L foot ulcer under care of Dr. Moore.     Interim Events:  Back today with glucose log.  Glucoses improved.  At last visit put back on conventional insulin therapy.  Presupper readings still markedly elevated.  A couple of 500 ranges likely r/t icecream in afternoon, but wife states otherwise no snacking.  No hypoglycemia.                 Diabetes Flow Sheet:   Diabetes Medications:                                  lantus 14 bid--per hosp d/c                                  75 NPH + 25 Regular with breakfast,   25 NPH and 15 Regular with Supper.    Prior Regimen---  Novolin 70/30--80 units before breakfast, 35 before supper.     Novolog 70/30  30 with breakfast, 40 with supper        suspected pt missing most shots. 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 Left CEA    SOCIAL HISTORY: He does use chewing tobacco. No alcohol use    FAMILY HISTORY: Diabetes      Review of Systems    Objective:   Physical Exam   ]      Lab Review:     Chemistry        Component Value Date/Time     08/16/2017 1320    K 4.5 08/16/2017 1320     08/16/2017 1320    CO2 27 08/16/2017 1320    BUN 16 08/16/2017 1320    CREATININE 1.3 08/16/2017 1320     (H) 08/16/2017 1320        Component Value Date/Time    CALCIUM 9.5 08/16/2017 1320    ALKPHOS 146 (H) 08/16/2017 1320    AST 33 08/16/2017 1320    ALT 49 (H) 08/16/2017 1320    BILITOT 0.5 08/16/2017 1320        Hemoglobin A1C   Date Value Ref Range Status   08/31/2017 8.0 (H) 4.0 - 5.6 % Final     Comment:     According to ADA guidelines, hemoglobin A1c <7.0% represents  optimal control in non-pregnant diabetic patients. Different  metrics may apply to specific patient populations.   Standards of Medical Care in Diabetes-2016.  For the purpose of screening for the presence of diabetes:  <5.7%     Consistent with the absence of diabetes  5.7-6.4%  Consistent with increasing risk for diabetes   (prediabetes)  >or=6.5%  Consistent with diabetes  Currently, no consensus exists for use of hemoglobin A1c  for diagnosis of diabetes for children.  This Hemoglobin A1c assay has significant interference with fetal   hemoglobin   (HbF). The results are invalid for patients with abnormal amounts of   HbF,   including those with known Hereditary Persistence   of Fetal Hemoglobin. Heterozygous hemoglobin variants (HbAS, HbAC,   HbAD, HbAE, HbA2) do not significantly interfere with this assay;   however, presence of multiple variants in a sample may impact the %   interference.     04/28/2017 8.9 (H) 4.5 - 6.2 % Final     Comment:     According to ADA guidelines, hemoglobin A1C <7.0% represents  optimal control in non-pregnant diabetic patients.  Different  metrics may apply to specific populations.   Standards  of Medical Care in Diabetes - 2016.  For the purpose of screening for the presence of diabetes:  <5.7%     Consistent with the absence of diabetes  5.7-6.4%  Consistent with increasing risk for diabetes   (prediabetes)  >or=6.5%  Consistent with diabetes  Currently no consensus exists for use of hemoglobin A1C  for diagnosis of diabetes for children.     12/15/2016 11.6 (H) 0.0 - 5.6 % Final     Comment:     Reference Interval:  5.0 - 5.6 Normal   5.7 - 6.4 High Risk   > 6.5 Diabetic    Hgb A1c results are standardized based on the (NGSP) National   Glycohemoglobin Standardization Program.    Hemoglobin A1C levels are related to mean serum/plasma glucose   during the preceding 2-3 months.          Lab Results   Component Value Date    LDLCALC 58.0 (L) 12/16/2016     Lab Results   Component Value Date    TSH 2.300 12/15/2016     Component      Latest Ref Rng & Units 12/15/2016   Microalbum.,U,Random      ug/mL 874.0   Creatinine, Random Ur      23.0 - 375.0 mg/dL 114.0   Microalb Creat Ratio      0.0 - 30.0 ug/mg 766.7 (H)       Assessment:     1. Type 2 diabetes mellitus with complication, with long-term current use of insulin  --chronic--uncontrolled-see plan    2. Type 2 diabetes mellitus with foot ulcer, with long-term current use of insulin  --acute --high risk-improve glucoses.    3. Medication monitoring encounter            Plan:   D/c lantus   Restart N & R--will start at 15 N plus 5 regular 30 min prior breakfast and supper.   Cont checking glucoses QID  --can fax or drop off log in 7-10 days.       Change doses as follows.   20 N & 7 R before breakfast-----suspect the NPH needs to be about 25 units.    15 NPH and 7 R before supper.     Orders 9/1317  Cancel October appt. --pt and wife advised I would not be here.      4 mo with fasting cmp, lipids, a1c, urine m/c prior

## 2017-09-13 NOTE — TELEPHONE ENCOUNTER
----- Message from Seun Juárez DPM sent at 9/13/2017  3:15 PM CDT -----  A referral was ordered for above patient, gangrene toe, PVD, can he be scheduled as soon as possible.

## 2017-09-14 ENCOUNTER — OFFICE VISIT (OUTPATIENT)
Dept: VASCULAR SURGERY | Facility: CLINIC | Age: 72
End: 2017-09-14
Payer: MEDICARE

## 2017-09-14 VITALS
BODY MASS INDEX: 29.83 KG/M2 | DIASTOLIC BLOOD PRESSURE: 77 MMHG | SYSTOLIC BLOOD PRESSURE: 202 MMHG | HEART RATE: 82 BPM | WEIGHT: 158 LBS | HEIGHT: 61 IN

## 2017-09-14 DIAGNOSIS — I73.9 PVD (PERIPHERAL VASCULAR DISEASE): Primary | ICD-10-CM

## 2017-09-14 PROCEDURE — 99202 OFFICE O/P NEW SF 15 MIN: CPT | Mod: S$GLB,,, | Performed by: THORACIC SURGERY (CARDIOTHORACIC VASCULAR SURGERY)

## 2017-09-14 PROCEDURE — 3077F SYST BP >= 140 MM HG: CPT | Mod: S$GLB,,, | Performed by: THORACIC SURGERY (CARDIOTHORACIC VASCULAR SURGERY)

## 2017-09-14 PROCEDURE — 3008F BODY MASS INDEX DOCD: CPT | Mod: S$GLB,,, | Performed by: THORACIC SURGERY (CARDIOTHORACIC VASCULAR SURGERY)

## 2017-09-14 PROCEDURE — 99999 PR PBB SHADOW E&M-EST. PATIENT-LVL II: CPT | Mod: PBBFAC,,, | Performed by: THORACIC SURGERY (CARDIOTHORACIC VASCULAR SURGERY)

## 2017-09-14 PROCEDURE — 1126F AMNT PAIN NOTED NONE PRSNT: CPT | Mod: S$GLB,,, | Performed by: THORACIC SURGERY (CARDIOTHORACIC VASCULAR SURGERY)

## 2017-09-14 PROCEDURE — 1159F MED LIST DOCD IN RCRD: CPT | Mod: S$GLB,,, | Performed by: THORACIC SURGERY (CARDIOTHORACIC VASCULAR SURGERY)

## 2017-09-14 PROCEDURE — 99499 UNLISTED E&M SERVICE: CPT | Mod: S$GLB,,, | Performed by: THORACIC SURGERY (CARDIOTHORACIC VASCULAR SURGERY)

## 2017-09-14 PROCEDURE — 3078F DIAST BP <80 MM HG: CPT | Mod: S$GLB,,, | Performed by: THORACIC SURGERY (CARDIOTHORACIC VASCULAR SURGERY)

## 2017-09-14 NOTE — TELEPHONE ENCOUNTER
Wife came into clinic and asked about the medication and I explained to her that Dr. Juárez changed the medication to Augmentin. Stated verbal understanding.

## 2017-09-14 NOTE — PROGRESS NOTES
HISTORY OF PRESENT ILLNESS:  A 71-year-old gentleman with nonhealing ulcers on   the left foot.  He was seen by the Podiatry Service and was felt to have   vascular insufficiency.  I in fact have seen the patient over the years for   severe vascular disease as well as carotid occlusive disease.  He has had   previous angiography of the extremities and angioplasty of the left popliteal   artery, which on ultrasound now appears to be occluded.  His medicines are   noted.  They are part of the recent EPIC record.  He is on antibiotics.  His   problem list is fairly extensive and also noted.  He has no other pertinent   surgical history.    SOCIAL HISTORY:  He quit smoking some time ago.    PHYSICAL EXAMINATION:  VITAL SIGNS:  Stable.  GENERAL:  He is awake, alert, in no distress.  HEENT:  Pupils equal, round and reactive to light.  Nose and throat are clear.  NECK:  Supple.  CHEST:  Clear to auscultation.  HEART:  Regular rate and rhythm.  ABDOMEN:  Benign.  EXTREMITIES:  Femoral pulses are somewhat diminished as well as pedal pulses.    He has ulcers on the left foot consistent with vascular insufficiency.     At this point, we will arrange for angiography of the extremities and if   possible, percutaneous and/or surgical intervention.      OTONIEL  dd: 09/14/2017 09:18:44 (CDT)  td: 09/15/2017 02:28:12 (CDT)  Doc ID   #[id number]  Job ID #[id number]    CC:

## 2017-09-15 NOTE — PROGRESS NOTES
Subjective:       Patient ID: Chema Ambrose is a 71 y.o. male.    Chief Complaint: Wound Care and Diabetes Mellitus (yuli 8/16/17  A1c 8.0 8/31/17)    HPI  Chema is a 71 y.o. male who presents to the clinic for evaluation and treatment of high risk feet and left 2nd toe ulcer, PVD. Chema has a past medical history of Asthma; Cataract; Chronic renal disease, stage 3, moderately decreased glomerular filtration rate between 30-59 mL/min/1.73 square meter; DM type 2 (diabetes mellitus, type 2); GERD (gastroesophageal reflux disease); HTN (hypertension); Hyperlipidemia; Kidney stone (2003); Low serum testosterone level; Murmur, cardiac; Myocardial infarction; Obesity; LISSETH (obstructive sleep apnea); Osteopenia; Pancreatitis (12/26/13); Seasonal allergies; Stroke; and Urinary tract infection. The patient's chief complaint is a for a f/u to continued swollen left 2nd toe with ulcer x 1 week. This patient has documented high risk feet requiring routine maintenance secondary to peripheral neuropathy and PVD.  She has been followed by Dr. Moore.  She does have an referral to Dr. Alston but has not yet been contacted.  Reporting that wound looks the same as last week.  Cultures of wound left 2nd toe 8/24/17 enterococcus faecalis sens to ampicillin, she was on ampicillin and is out.  Wound care has been iodosorb paste and padded guaze dressing.    PCP: Марина Garcia MD            Hemoglobin A1C   Date Value Ref Range Status   08/31/2017 8.0 (H) 4.0 - 5.6 % Final     Comment:     According to ADA guidelines, hemoglobin A1c <7.0% represents  optimal control in non-pregnant diabetic patients. Different  metrics may apply to specific patient populations.   Standards of Medical Care in Diabetes-2016.  For the purpose of screening for the presence of diabetes:  <5.7%     Consistent with the absence of diabetes  5.7-6.4%  Consistent with increasing risk for diabetes   (prediabetes)  >or=6.5%  Consistent with  diabetes  Currently, no consensus exists for use of hemoglobin A1c  for diagnosis of diabetes for children.  This Hemoglobin A1c assay has significant interference with fetal   hemoglobin   (HbF). The results are invalid for patients with abnormal amounts of   HbF,   including those with known Hereditary Persistence   of Fetal Hemoglobin. Heterozygous hemoglobin variants (HbAS, HbAC,   HbAD, HbAE, HbA2) do not significantly interfere with this assay;   however, presence of multiple variants in a sample may impact the %   interference.     04/28/2017 8.9 (H) 4.5 - 6.2 % Final     Comment:     According to ADA guidelines, hemoglobin A1C <7.0% represents  optimal control in non-pregnant diabetic patients.  Different  metrics may apply to specific populations.   Standards of Medical Care in Diabetes - 2016.  For the purpose of screening for the presence of diabetes:  <5.7%     Consistent with the absence of diabetes  5.7-6.4%  Consistent with increasing risk for diabetes   (prediabetes)  >or=6.5%  Consistent with diabetes  Currently no consensus exists for use of hemoglobin A1C  for diagnosis of diabetes for children.     12/15/2016 11.6 (H) 0.0 - 5.6 % Final     Comment:     Reference Interval:  5.0 - 5.6 Normal   5.7 - 6.4 High Risk   > 6.5 Diabetic    Hgb A1c results are standardized based on the (NGSP) National   Glycohemoglobin Standardization Program.    Hemoglobin A1C levels are related to mean serum/plasma glucose   during the preceding 2-3 months.          Review of Systems  ROS:  Constitution: Negative for chills, fever, weakness and malaise/fatigue.   HEENT: Negative for headaches.   Cardiovascular: Negative for chest pain and claudication.   Respiratory: Negative for cough and shortness of breath.   Musculoskeletal: Positive for foot pain.  Negative for muscle cramps and muscle weakness.   Gastrointestinal: Negative for nausea and vomiting.   Neurological: Positive for numbness and paresthesias.    Dermatological: Positive for wound.        Objective:      Physical Exam  Constitutional:   Patient is oriented to person, place, and time. Vital signs are normal. Appears well-developed and well-nourished.     Vascular:   Dorsalis pedis pulses are 1+ on the right side, and 1+ on the left side.   Posterior tibial pulses are 1+ on the right side, and 1+ on the left side.   - digital hair growth, capillary fill time to all toes <3 seconds, toes are cool to touch  + swelling feet and ankles    Skin/Dermatological:   Skin is thin, warm, shiny and atrophic. No cyanosis or clubbing.  Open wound noted to the medial aspect of the Lt. 2nd PIP joint.  Wound base is down to subQ.  Base is comprised of fibrinous tissue.  Zoey wound is mildly edematous.  Dusky appearance noted to the entire digit ( this is unchanged per last week's note).  Zoey wound is devoid of erythema, fluctuance, purulence, and malodor.  Measures 0.9 x 0.6 x 0.2cm.       Musculoskeletal:   Mild bunions, hammertoes and bunionettes observed.  Decreased range of motion of bilateral midtarsal, subtalar joints, ankle joint dorsiflexion is restricted bilaterally. Muscle strength to tibialis anterior, extensor hallucis longus, extensor digitorum longus, peroneal muscles, flexor hallucis/digotorum longus, posterior tibial and gastrosoleal complex is 5/5.    Neurological:   Positive deficits to sharp/dull, light touch or vibratory sensation bilateral feet             Assessment:       1. PVD (peripheral vascular disease)    2. Diabetic ulcer of toe of left foot associated with type 2 diabetes mellitus, with fat layer exposed    3. Cellulitis of toe of left foot    4. Insulin long-term use        Plan:       PVD (peripheral vascular disease)    Diabetic ulcer of toe of left foot associated with type 2 diabetes mellitus, with fat layer exposed    Cellulitis of toe of left foot    Insulin long-term use    Message sent to Dr. Alston for urgent appointment this week  if possible or to ED if do not hear by week's end.        I counseled the patient on the conditions, their implications and medical management.    No wound debridement was performed with today's exam, as the wound is ischemic.    Iodosorb paste and very light  football gauzewas applied.    Advised to keep today's dressing CDI x 1 week.    Advised to ambulate minimally in the postoperative shoe to facilitate wound healing.     Return in about 1 week.       Procedures

## 2017-09-20 ENCOUNTER — TELEPHONE (OUTPATIENT)
Dept: ADMINISTRATIVE | Facility: HOSPITAL | Age: 72
End: 2017-09-20

## 2017-09-21 ENCOUNTER — OUTSIDE PLACE OF SERVICE (OUTPATIENT)
Dept: ADMINISTRATIVE | Facility: OTHER | Age: 72
End: 2017-09-21
Payer: MEDICARE

## 2017-09-21 PROCEDURE — 75630 X-RAY AORTA LEG ARTERIES: CPT | Mod: 26,,, | Performed by: THORACIC SURGERY (CARDIOTHORACIC VASCULAR SURGERY)

## 2017-09-21 PROCEDURE — 36200 PLACE CATHETER IN AORTA: CPT | Mod: ,,, | Performed by: THORACIC SURGERY (CARDIOTHORACIC VASCULAR SURGERY)

## 2017-09-22 ENCOUNTER — OFFICE VISIT (OUTPATIENT)
Dept: PODIATRY | Facility: CLINIC | Age: 72
End: 2017-09-22
Payer: MEDICARE

## 2017-09-22 DIAGNOSIS — M86.9 OSTEOMYELITIS OF ANKLE OR FOOT: ICD-10-CM

## 2017-09-22 DIAGNOSIS — I73.9 PVD (PERIPHERAL VASCULAR DISEASE): ICD-10-CM

## 2017-09-22 DIAGNOSIS — L97.522 DIABETIC ULCER OF TOE OF LEFT FOOT ASSOCIATED WITH TYPE 2 DIABETES MELLITUS, WITH FAT LAYER EXPOSED: Primary | ICD-10-CM

## 2017-09-22 DIAGNOSIS — E11.621 DIABETIC ULCER OF TOE OF LEFT FOOT ASSOCIATED WITH TYPE 2 DIABETES MELLITUS, WITH FAT LAYER EXPOSED: Primary | ICD-10-CM

## 2017-09-22 PROCEDURE — 99499 UNLISTED E&M SERVICE: CPT | Mod: S$GLB,,,

## 2017-09-22 PROCEDURE — 99213 OFFICE O/P EST LOW 20 MIN: CPT | Mod: S$GLB,,,

## 2017-09-22 PROCEDURE — 1125F AMNT PAIN NOTED PAIN PRSNT: CPT | Mod: S$GLB,,,

## 2017-09-22 PROCEDURE — 99999 PR PBB SHADOW E&M-EST. PATIENT-LVL II: CPT | Mod: PBBFAC,,,

## 2017-09-22 PROCEDURE — 3008F BODY MASS INDEX DOCD: CPT | Mod: S$GLB,,,

## 2017-09-22 PROCEDURE — 1159F MED LIST DOCD IN RCRD: CPT | Mod: S$GLB,,,

## 2017-09-22 NOTE — PROGRESS NOTES
Subjective:       Patient ID: Chema Ambrose is a 71 y.o. male.    Chief Complaint: Follow-up (wound check Lt foot )    HPI  Chema is a 71 y.o. male who presents to the clinic for evaluation and treatment of high risk feet and left 2nd toe ulcer, PVD. Chema has a past medical history of Asthma; Cataract; Chronic renal disease, stage 3, moderately decreased glomerular filtration rate between 30-59 mL/min/1.73 square meter; DM type 2 (diabetes mellitus, type 2); GERD (gastroesophageal reflux disease); HTN (hypertension); Hyperlipidemia; Kidney stone (2003); Low serum testosterone level; Murmur, cardiac; Myocardial infarction; Obesity; LISSETH (obstructive sleep apnea); Osteopenia; Pancreatitis (12/26/13); Seasonal allergies; Stroke; and Urinary tract infection. The patient's chief complaint is a for a f/u to continued swollen left 2nd toe with ulcer x 1 week. This patient has documented high risk feet requiring routine maintenance secondary to peripheral neuropathy and PVD.  She has been followed by Dr. Moore.  She did see Dr. Alston 9/26/17 and is due to have an angiogram 10/5/17.  Cultures of wound left 2nd toe 8/24/17 enterococcus faecalis sens to ampicillin, he is on amoxicillin. Wound care has been iodosorb paste and padded guaze dressing.    PCP: Марина Garcia MD            Hemoglobin A1C   Date Value Ref Range Status   08/31/2017 8.0 (H) 4.0 - 5.6 % Final     Comment:     According to ADA guidelines, hemoglobin A1c <7.0% represents  optimal control in non-pregnant diabetic patients. Different  metrics may apply to specific patient populations.   Standards of Medical Care in Diabetes-2016.  For the purpose of screening for the presence of diabetes:  <5.7%     Consistent with the absence of diabetes  5.7-6.4%  Consistent with increasing risk for diabetes   (prediabetes)  >or=6.5%  Consistent with diabetes  Currently, no consensus exists for use of hemoglobin A1c  for diagnosis of diabetes for  children.  This Hemoglobin A1c assay has significant interference with fetal   hemoglobin   (HbF). The results are invalid for patients with abnormal amounts of   HbF,   including those with known Hereditary Persistence   of Fetal Hemoglobin. Heterozygous hemoglobin variants (HbAS, HbAC,   HbAD, HbAE, HbA2) do not significantly interfere with this assay;   however, presence of multiple variants in a sample may impact the %   interference.     04/28/2017 8.9 (H) 4.5 - 6.2 % Final     Comment:     According to ADA guidelines, hemoglobin A1C <7.0% represents  optimal control in non-pregnant diabetic patients.  Different  metrics may apply to specific populations.   Standards of Medical Care in Diabetes - 2016.  For the purpose of screening for the presence of diabetes:  <5.7%     Consistent with the absence of diabetes  5.7-6.4%  Consistent with increasing risk for diabetes   (prediabetes)  >or=6.5%  Consistent with diabetes  Currently no consensus exists for use of hemoglobin A1C  for diagnosis of diabetes for children.     12/15/2016 11.6 (H) 0.0 - 5.6 % Final     Comment:     Reference Interval:  5.0 - 5.6 Normal   5.7 - 6.4 High Risk   > 6.5 Diabetic    Hgb A1c results are standardized based on the (NGSP) National   Glycohemoglobin Standardization Program.    Hemoglobin A1C levels are related to mean serum/plasma glucose   during the preceding 2-3 months.          Review of Systems  ROS:  Constitution: Negative for chills, fever, weakness and malaise/fatigue.   HEENT: Negative for headaches.   Cardiovascular: Negative for chest pain and claudication.   Respiratory: Negative for cough and shortness of breath.   Musculoskeletal: Positive for foot pain.  Negative for muscle cramps and muscle weakness.   Gastrointestinal: Negative for nausea and vomiting.   Neurological: Positive for numbness and paresthesias.   Dermatological: Positive for wound.        Objective:      Physical Exam  Constitutional:   Patient is  oriented to person, place, and time. Vital signs are normal. Appears well-developed and well-nourished.     Vascular:   Dorsalis pedis pulses are 1+ on the right side, and 1+ on the left side.   Posterior tibial pulses are 1+ on the right side, and 1+ on the left side.   - digital hair growth, capillary fill time to all toes <3 seconds, toes are cool to touch  + swelling feet and ankles    Skin/Dermatological:   Skin is thin, warm, shiny and atrophic. No cyanosis or clubbing.  Open wound noted to the medial aspect of the Lt. 2nd PIP joint.  Wound base is down to bone.  Base is comprised of fibrinous tissue/slough.  Zuleyka wound is mildly edematous.  Toe is dusky, zuleyka wound is devoid of erythema, fluctuance, purulence, and malodor.  Measures 0.7 x 0.5 x 0.2cm.       Musculoskeletal:   Mild bunions, hammertoes and bunionettes observed.  Decreased range of motion of bilateral midtarsal, subtalar joints, ankle joint dorsiflexion is restricted bilaterally. Muscle strength to tibialis anterior, extensor hallucis longus, extensor digitorum longus, peroneal muscles, flexor hallucis/digotorum longus, posterior tibial and gastrosoleal complex is 5/5.    Neurological:   Positive deficits to sharp/dull, light touch or vibratory sensation bilateral feet         Assessment:       1. Diabetic ulcer of toe of left foot associated with type 2 diabetes mellitus, with fat layer exposed    2. PVD (peripheral vascular disease)    3. Osteomyelitis of ankle or foot        Plan:             I counseled the patient on the conditions, their implications and medical management.    No wound debridement was performed with today's exam, as the wound is ischemic.  I do not believe this toe will hea and recommend a toe amputation following intervention by Dr. Alston.  Consider MRI of toe.    Iodosorb paste and very light  football gauzewas applied.    Advised to keep today's dressing CDI x 1 week.    Advised to ambulate minimally in the  postoperative shoe to facilitate wound healing.     Continue Augmentin.    Return in about 1 week with Dr. Moore.       Procedures

## 2017-09-25 DIAGNOSIS — I12.9 TYPE 2 DM WITH CKD STAGE 3 AND HYPERTENSION: ICD-10-CM

## 2017-09-25 DIAGNOSIS — E11.22 TYPE 2 DM WITH CKD STAGE 3 AND HYPERTENSION: ICD-10-CM

## 2017-09-25 DIAGNOSIS — E78.5 TYPE 2 DIABETES MELLITUS WITH HYPERLIPIDEMIA: ICD-10-CM

## 2017-09-25 DIAGNOSIS — E11.69 TYPE 2 DIABETES MELLITUS WITH HYPERLIPIDEMIA: ICD-10-CM

## 2017-09-25 DIAGNOSIS — N18.30 TYPE 2 DM WITH CKD STAGE 3 AND HYPERTENSION: ICD-10-CM

## 2017-09-26 RX ORDER — HYDROCHLOROTHIAZIDE 25 MG/1
25 TABLET ORAL DAILY
Qty: 30 TABLET | Refills: 0 | Status: SHIPPED | OUTPATIENT
Start: 2017-09-26 | End: 2017-10-03 | Stop reason: SDUPTHER

## 2017-09-26 RX ORDER — AMLODIPINE BESYLATE 10 MG/1
10 TABLET ORAL DAILY
Qty: 30 TABLET | Refills: 0 | Status: SHIPPED | OUTPATIENT
Start: 2017-09-26 | End: 2017-10-03 | Stop reason: SDUPTHER

## 2017-09-26 RX ORDER — ATORVASTATIN CALCIUM 80 MG/1
80 TABLET, FILM COATED ORAL NIGHTLY
Qty: 90 TABLET | Refills: 0 | Status: SHIPPED | OUTPATIENT
Start: 2017-09-26 | End: 2017-10-03 | Stop reason: SDUPTHER

## 2017-09-26 RX ORDER — HYDRALAZINE HYDROCHLORIDE 50 MG/1
25 TABLET, FILM COATED ORAL 3 TIMES DAILY
Qty: 90 TABLET | Refills: 0 | Status: SHIPPED | OUTPATIENT
Start: 2017-09-26 | End: 2017-10-03 | Stop reason: SDUPTHER

## 2017-09-26 RX ORDER — CLOPIDOGREL BISULFATE 75 MG/1
75 TABLET ORAL DAILY
Qty: 30 TABLET | Refills: 0 | Status: SHIPPED | OUTPATIENT
Start: 2017-09-26 | End: 2017-10-03 | Stop reason: SDUPTHER

## 2017-09-26 RX ORDER — METOPROLOL TARTRATE 25 MG/1
25 TABLET, FILM COATED ORAL 2 TIMES DAILY
Qty: 60 TABLET | Refills: 0 | Status: SHIPPED | OUTPATIENT
Start: 2017-09-26 | End: 2017-10-03 | Stop reason: SDUPTHER

## 2017-09-26 RX ORDER — PANTOPRAZOLE SODIUM 40 MG/1
40 TABLET, DELAYED RELEASE ORAL DAILY
Qty: 90 TABLET | Refills: 0 | Status: SHIPPED | OUTPATIENT
Start: 2017-09-26 | End: 2017-10-03 | Stop reason: SDUPTHER

## 2017-09-29 ENCOUNTER — OFFICE VISIT (OUTPATIENT)
Dept: PODIATRY | Facility: CLINIC | Age: 72
End: 2017-09-29
Payer: MEDICARE

## 2017-09-29 VITALS — WEIGHT: 153.44 LBS | BODY MASS INDEX: 28.97 KG/M2 | HEIGHT: 61 IN

## 2017-09-29 DIAGNOSIS — I73.9 PVD (PERIPHERAL VASCULAR DISEASE): ICD-10-CM

## 2017-09-29 DIAGNOSIS — M86.9 OSTEOMYELITIS OF ANKLE OR FOOT: ICD-10-CM

## 2017-09-29 DIAGNOSIS — L97.522 DIABETIC ULCER OF TOE OF LEFT FOOT ASSOCIATED WITH TYPE 2 DIABETES MELLITUS, WITH FAT LAYER EXPOSED: Primary | ICD-10-CM

## 2017-09-29 DIAGNOSIS — E11.621 DIABETIC ULCER OF TOE OF LEFT FOOT ASSOCIATED WITH TYPE 2 DIABETES MELLITUS, WITH FAT LAYER EXPOSED: Primary | ICD-10-CM

## 2017-09-29 PROCEDURE — 1125F AMNT PAIN NOTED PAIN PRSNT: CPT | Mod: S$GLB,,, | Performed by: PODIATRIST

## 2017-09-29 PROCEDURE — 99499 UNLISTED E&M SERVICE: CPT | Mod: S$GLB,,, | Performed by: PODIATRIST

## 2017-09-29 PROCEDURE — 99999 PR PBB SHADOW E&M-EST. PATIENT-LVL III: CPT | Mod: PBBFAC,,, | Performed by: PODIATRIST

## 2017-09-29 PROCEDURE — 1159F MED LIST DOCD IN RCRD: CPT | Mod: S$GLB,,, | Performed by: PODIATRIST

## 2017-09-29 PROCEDURE — 3008F BODY MASS INDEX DOCD: CPT | Mod: S$GLB,,, | Performed by: PODIATRIST

## 2017-09-29 PROCEDURE — 99213 OFFICE O/P EST LOW 20 MIN: CPT | Mod: S$GLB,,, | Performed by: PODIATRIST

## 2017-09-29 RX ORDER — FLUTICASONE FUROATE AND VILANTEROL 200; 25 UG/1; UG/1
1 POWDER RESPIRATORY (INHALATION)
COMMUNITY
Start: 2016-12-20 | End: 2017-12-20

## 2017-09-29 RX ORDER — FLUTICASONE PROPIONATE 100 UG/1
POWDER, METERED RESPIRATORY (INHALATION)
COMMUNITY
Start: 2017-02-08

## 2017-09-29 RX ORDER — CLOPIDOGREL BISULFATE 75 MG/1
75 TABLET ORAL
COMMUNITY
Start: 2017-08-09 | End: 2017-10-05

## 2017-09-29 RX ORDER — ASPIRIN 81 MG/1
81 TABLET ORAL
COMMUNITY

## 2017-09-29 RX ORDER — FLUTICASONE PROPIONATE 50 MCG
SPRAY, SUSPENSION (ML) NASAL
COMMUNITY
Start: 2016-09-15

## 2017-09-29 RX ORDER — AMOXICILLIN AND CLAVULANATE POTASSIUM 875; 125 MG/1; MG/1
1 TABLET, FILM COATED ORAL
COMMUNITY
Start: 2017-09-13

## 2017-09-29 RX ORDER — LISINOPRIL 10 MG/1
10 TABLET ORAL
COMMUNITY

## 2017-09-29 RX ORDER — AMOXICILLIN AND CLAVULANATE POTASSIUM 875; 125 MG/1; MG/1
TABLET, FILM COATED ORAL
Qty: 20 TABLET | Refills: 0 | Status: SHIPPED | OUTPATIENT
Start: 2017-09-29

## 2017-09-29 NOTE — PROGRESS NOTES
Subjective:       Patient ID: Chema Ambrose is a 71 y.o. male.    Chief Complaint: Diabetes Mellitus (a1c 8.0 8/31/17 Radha 8/16/17) and Wound Care    HPI  Chema is a 71 y.o. male who presents to the clinic for evaluation and treatment of high risk feet and left 2nd toe ulcer, PVD. Chema has a past medical history of Asthma; Cataract; Chronic renal disease, stage 3, moderately decreased glomerular filtration rate between 30-59 mL/min/1.73 square meter; DM type 2 (diabetes mellitus, type 2); GERD (gastroesophageal reflux disease); HTN (hypertension); Hyperlipidemia; Kidney stone (2003); Low serum testosterone level; Murmur, cardiac; Myocardial infarction; Obesity; LISSETH (obstructive sleep apnea); Osteopenia; Pancreatitis (12/26/13); Seasonal allergies; Stroke; and Urinary tract infection. The patient's chief complaint is a for a f/u to continued swollen left 2nd toe with ulcer x 1 week. This patient has documented high risk feet requiring routine maintenance secondary to peripheral neuropathy and PVD.  She has been followed by Dr. Moore.  She did see Dr. Alston 9/26/17 and is due to have an angiogram 10/5/17.  Cultures of wound left 2nd toe 8/24/17 enterococcus faecalis sens to ampicillin, he is on amoxicillin. Wound care has been iodosorb paste and padded guaze dressing.    PCP: Марина Garcia MD            Hemoglobin A1C   Date Value Ref Range Status   08/31/2017 8.0 (H) 4.0 - 5.6 % Final     Comment:     According to ADA guidelines, hemoglobin A1c <7.0% represents  optimal control in non-pregnant diabetic patients. Different  metrics may apply to specific patient populations.   Standards of Medical Care in Diabetes-2016.  For the purpose of screening for the presence of diabetes:  <5.7%     Consistent with the absence of diabetes  5.7-6.4%  Consistent with increasing risk for diabetes   (prediabetes)  >or=6.5%  Consistent with diabetes  Currently, no consensus exists for use of hemoglobin A1c  for  diagnosis of diabetes for children.  This Hemoglobin A1c assay has significant interference with fetal   hemoglobin   (HbF). The results are invalid for patients with abnormal amounts of   HbF,   including those with known Hereditary Persistence   of Fetal Hemoglobin. Heterozygous hemoglobin variants (HbAS, HbAC,   HbAD, HbAE, HbA2) do not significantly interfere with this assay;   however, presence of multiple variants in a sample may impact the %   interference.     04/28/2017 8.9 (H) 4.5 - 6.2 % Final     Comment:     According to ADA guidelines, hemoglobin A1C <7.0% represents  optimal control in non-pregnant diabetic patients.  Different  metrics may apply to specific populations.   Standards of Medical Care in Diabetes - 2016.  For the purpose of screening for the presence of diabetes:  <5.7%     Consistent with the absence of diabetes  5.7-6.4%  Consistent with increasing risk for diabetes   (prediabetes)  >or=6.5%  Consistent with diabetes  Currently no consensus exists for use of hemoglobin A1C  for diagnosis of diabetes for children.     12/15/2016 11.6 (H) 0.0 - 5.6 % Final     Comment:     Reference Interval:  5.0 - 5.6 Normal   5.7 - 6.4 High Risk   > 6.5 Diabetic    Hgb A1c results are standardized based on the (NGSP) National   Glycohemoglobin Standardization Program.    Hemoglobin A1C levels are related to mean serum/plasma glucose   during the preceding 2-3 months.          Review of Systems  ROS:  Constitution: Negative for chills, fever, weakness and malaise/fatigue.   HEENT: Negative for headaches.   Cardiovascular: Negative for chest pain and claudication.   Respiratory: Negative for cough and shortness of breath.   Musculoskeletal: Positive for foot pain.  Negative for muscle cramps and muscle weakness.   Gastrointestinal: Negative for nausea and vomiting.   Neurological: Positive for numbness and paresthesias.   Dermatological: Positive for wound.        Objective:      Physical  Exam  Constitutional:   Patient is oriented to person, place, and time. Vital signs are normal. Appears well-developed and well-nourished.     Vascular:   Dorsalis pedis pulses are 1+ on the right side, and 1+ on the left side.   Posterior tibial pulses are 1+ on the right side, and 1+ on the left side.   - digital hair growth, capillary fill time to all toes <3 seconds, toes are cool to touch  + swelling feet and ankles    Skin/Dermatological:   Skin is thin, warm, shiny and atrophic. No cyanosis or clubbing.  Open wound noted to the medial aspect of the Lt. 2nd PIP joint.  Wound base is down to bone.  Base is comprised of fibrinous tissue/slough.  Zuleyka wound is mildly edematous.  Toe is dusky, zuleyka wound is devoid of erythema, fluctuance, purulence, and malodor.  Measures 0.7 x 0.5 x 0.2cm.       Musculoskeletal:   Mild bunions, hammertoes and bunionettes observed.  Decreased range of motion of bilateral midtarsal, subtalar joints, ankle joint dorsiflexion is restricted bilaterally. Muscle strength to tibialis anterior, extensor hallucis longus, extensor digitorum longus, peroneal muscles, flexor hallucis/digotorum longus, posterior tibial and gastrosoleal complex is 5/5.    Neurological:   Positive deficits to sharp/dull, light touch or vibratory sensation bilateral feet         Assessment:       1. Diabetic ulcer of toe of left foot associated with type 2 diabetes mellitus, with fat layer exposed    2. PVD (peripheral vascular disease)    3. Osteomyelitis of ankle or foot        Plan:             I counseled the patient on the conditions, their implications and medical management.    No wound debridement was performed with today's exam, as the wound is ischemic.  I do not believe this toe will hea and recommend a toe amputation following intervention by Dr. Alston.  Consider MRI of toe.    Iodosorb paste and very light  football gauzewas applied.    Advised to keep today's dressing CDI x 1 week.    Advised to  ambulate minimally in the postoperative shoe to facilitate wound healing.     Continue Augmentin.    Return in about 1 week with Dr. Moore.       Procedures  Subjective:      Patient ID: Chema Ambrose is a 71 y.o. male.    Chief Complaint: Diabetes Mellitus (a1c 8.0 8/31/17 Radha 8/16/17) and Wound Care  Patient presents to clinic for a 1 one week wound check of the Lt. Foot.  Relates continued pain to the Lt. 2nd toe wound with pressure and occasionally while at rest.  Has kept the previous clinic dressing clean, dry and intact x 1 week.  Has called his Cardiologist for an appointment, however, this has yet to be arranged.  Has been ambulating minimally in a postoperative shoe. Denies having N/V/F/C/D.  Denies any additional pedal complaints.    Review of Systems   Constitution: Negative for chills, decreased appetite, diaphoresis and fever.   Cardiovascular: Positive for leg swelling. Negative for claudication.   Skin: Positive for color change, dry skin and nail changes.   Musculoskeletal: Positive for arthritis and myalgias. Negative for back pain, falls, gout and joint pain.   Neurological: Positive for numbness. Negative for paresthesias.   Psychiatric/Behavioral: Negative for altered mental status.           Objective:      Physical Exam   Constitutional: He is oriented to person, place, and time. He appears well-developed and well-nourished. No distress.   Cardiovascular:   Pulses:       Dorsalis pedis pulses are 1+ on the right side, and 1+ on the left side.        Posterior tibial pulses are 1+ on the right side, and 1+ on the left side.   CFT 3 seconds bilateral with the exception of 4 seconds to the Lt. 2nd toe.  Pedal hair growth decreased bilateral.   No varicosities noted bilateral. Mild nonpitting edema noted to bilateral lower extremity.  Toes are cool to touch with increasing warmness proximal.       Musculoskeletal: Normal range of motion. He exhibits edema and tenderness.   Muscle strength  5/5 in all muscle groups bilateral.  No tenderness nor crepitation with ROM of foot/ankle joints bilateral.  Pain with palpation of the Lt. 2nd toe wound.  Bilateral rectus foot type.       Neurological: He is alert and oriented to person, place, and time. He has normal strength. A sensory deficit is present.   Protective sensation per [place]-Sybil monofilament intact bilateral.    Vibratory sensation decreased bilateral.    Light touch intact bilateral.   Skin: Skin is warm and dry. Lesion noted. No abrasion, no bruising, no burn, no ecchymosis, no laceration, no petechiae and no rash noted. He is not diaphoretic. No cyanosis or erythema. No pallor. Nails show no clubbing.   Open wound noted to the medial aspect of the Lt. 2nd PIP joint.  Wound base is down to subQ.  Base is comprised of fibrinous tissue.  Zoey wound is mildly edematous.  Dusky appearance noted to the entire digit with today's exam.  Zoey wound is devoid of erythema, fluctuance, purulence, and malodor.  Measures 1 x 0.5 x 0.2cm.               Assessment:       Encounter Diagnoses   Name Primary?    Diabetic ulcer of toe of left foot associated with type 2 diabetes mellitus, with fat layer exposed Yes    PVD (peripheral vascular disease)     Osteomyelitis of ankle or foot          Plan:       Chema was seen today for diabetes mellitus and wound care.    Diagnoses and all orders for this visit:    Diabetic ulcer of toe of left foot associated with type 2 diabetes mellitus, with fat layer exposed  -     MRI Lower Extremity Without Contrast Left; Future    PVD (peripheral vascular disease)    Osteomyelitis of ankle or foot  -     MRI Lower Extremity Without Contrast Left; Future      I counseled the patient on his conditions, their implications and medical management.    Staff contacted his cardiologist, Dr. Grajeda, to set up appointment as ischemic changes are noted to the Lt. 2nd toe in conjunction with a non healing wound.    No wound  debridement was performed due to patient's compromised circulation.    Wound base was covered with iodosorb.  The site was then covered with non adherent gauze, and a football dressing was applied.    Advised to finish the current course of ampicillin.    Recommend rest and elevation of the affected extremity.      Advised to keep today's dressing CDI until next week's follow up visit.    Discussed minimal ambulation to the affected extremity to facilitate wound healing.    RTC in 1 week for continued wound care with Dr. Juárez in my absence.    Shahriar Moore DPM

## 2017-09-30 NOTE — PROGRESS NOTES
Subjective:      Patient ID: Chema Ambrose is a 71 y.o. male.    Chief Complaint: Diabetes Mellitus (a1c 8.0 8/31/17 Radha 8/16/17) and Wound Care  Patient presents to clinic for a 1 week wound check of the Lt. 2nd toe.  Relates continued pain to the toe with direct pressure.  Describes pain as sharp and rates as a 2/10.  States symptoms are alleviated with rest.  Has kept the previous clinic dressing clean, dry, and intact x 1 week.  Relates minimal ambulation to the affected extremity.  Denies having N/V/F/C/D.  Denies any additional pedal complaints.    Review of Systems   Constitution: Negative for chills, decreased appetite, diaphoresis and fever.   Cardiovascular: Positive for leg swelling. Negative for claudication.   Skin: Positive for color change, dry skin and nail changes.   Musculoskeletal: Positive for arthritis and myalgias. Negative for back pain, falls, gout and joint pain.   Neurological: Positive for numbness. Negative for paresthesias.   Psychiatric/Behavioral: Negative for altered mental status.           Objective:      Physical Exam   Constitutional: He is oriented to person, place, and time. He appears well-developed and well-nourished. No distress.   Cardiovascular:   Pulses:       Dorsalis pedis pulses are 1+ on the right side, and 1+ on the left side.        Posterior tibial pulses are 1+ on the right side, and 1+ on the left side.   CFT 3 seconds bilateral with the exception of 4 seconds to the Lt. 2nd toe.  Pedal hair growth decreased bilateral.   No varicosities noted bilateral. Mild nonpitting edema noted to bilateral lower extremity.  Toes are cool to touch with increasing warmness proximal.       Musculoskeletal: Normal range of motion. He exhibits edema and tenderness.   Muscle strength 5/5 in all muscle groups bilateral.  No tenderness nor crepitation with ROM of foot/ankle joints bilateral.  Pain with palpation of the Lt. 2nd toe wound.  Bilateral rectus foot type.        Neurological: He is alert and oriented to person, place, and time. He has normal strength. A sensory deficit is present.   Protective sensation per [place]-Sybil monofilament intact bilateral.    Vibratory sensation decreased bilateral.    Light touch intact bilateral.   Skin: Skin is warm and dry. Lesion noted. No abrasion, no bruising, no burn, no ecchymosis, no laceration, no petechiae and no rash noted. He is not diaphoretic. No cyanosis or erythema. No pallor. Nails show no clubbing.   Open wound noted to the medial aspect of the Lt. 2nd PIP joint.  Wound base is down to subQ.  Base is comprised of fibrinous tissue.  Zoey wound is mildly edematous.  Dusky appearance noted to the entire digit with today's exam.  Zoey wound is devoid of erythema, fluctuance, purulence, and malodor.  Measures 1.7 x 1.2 x 0.2cm.               Assessment:       Encounter Diagnoses   Name Primary?    Diabetic ulcer of toe of left foot associated with type 2 diabetes mellitus, with fat layer exposed Yes    PVD (peripheral vascular disease)     Osteomyelitis of ankle or foot          Plan:       Chema was seen today for diabetes mellitus and wound care.    Diagnoses and all orders for this visit:    Diabetic ulcer of toe of left foot associated with type 2 diabetes mellitus, with fat layer exposed  -     MRI Lower Extremity Without Contrast Left; Future    PVD (peripheral vascular disease)    Osteomyelitis of ankle or foot  -     MRI Lower Extremity Without Contrast Left; Future      I counseled the patient on his conditions, their implications and medical management.    No wound debridement performed with today's exam due to comprised vascular status.    Wound base covered with iodosorb ointment.  A modified football dressing was applied with minimal tension to the bandage.    Order written for a MRI w/o contrast of the LLE to rule out osteomyelitis of the involved toe.    Advised to keep today's dressing CDI x 1  week.    Advised to rest and elevate the affected extremity.    Recommend minimal ambulation to facilitate wound healing.    Patient scheduled for angiography of the lower extremities by Dr. Alston.      RTC in 1 week for wound check.    Shahriar Moore DPM

## 2017-10-03 DIAGNOSIS — E11.69 TYPE 2 DIABETES MELLITUS WITH HYPERLIPIDEMIA: ICD-10-CM

## 2017-10-03 DIAGNOSIS — E78.5 TYPE 2 DIABETES MELLITUS WITH HYPERLIPIDEMIA: ICD-10-CM

## 2017-10-03 DIAGNOSIS — N18.30 TYPE 2 DM WITH CKD STAGE 3 AND HYPERTENSION: ICD-10-CM

## 2017-10-03 DIAGNOSIS — I12.9 TYPE 2 DM WITH CKD STAGE 3 AND HYPERTENSION: ICD-10-CM

## 2017-10-03 DIAGNOSIS — E11.22 TYPE 2 DM WITH CKD STAGE 3 AND HYPERTENSION: ICD-10-CM

## 2017-10-05 ENCOUNTER — OFFICE VISIT (OUTPATIENT)
Dept: VASCULAR SURGERY | Facility: CLINIC | Age: 72
End: 2017-10-05
Payer: MEDICARE

## 2017-10-05 VITALS
DIASTOLIC BLOOD PRESSURE: 44 MMHG | SYSTOLIC BLOOD PRESSURE: 115 MMHG | WEIGHT: 149.94 LBS | BODY MASS INDEX: 28.31 KG/M2 | HEART RATE: 50 BPM | HEIGHT: 61 IN

## 2017-10-05 DIAGNOSIS — Z98.62 S/P ANGIOPLASTY: Primary | ICD-10-CM

## 2017-10-05 DIAGNOSIS — I73.9 PAD (PERIPHERAL ARTERY DISEASE): ICD-10-CM

## 2017-10-05 DIAGNOSIS — S91.109D NON-HEALING OPEN WOUND OF TOE, SUBSEQUENT ENCOUNTER: ICD-10-CM

## 2017-10-05 PROCEDURE — 99499 UNLISTED E&M SERVICE: CPT | Mod: S$GLB,,, | Performed by: THORACIC SURGERY (CARDIOTHORACIC VASCULAR SURGERY)

## 2017-10-05 PROCEDURE — 99211 OFF/OP EST MAY X REQ PHY/QHP: CPT | Mod: S$GLB,,, | Performed by: THORACIC SURGERY (CARDIOTHORACIC VASCULAR SURGERY)

## 2017-10-05 PROCEDURE — 99999 PR PBB SHADOW E&M-EST. PATIENT-LVL III: CPT | Mod: PBBFAC,,, | Performed by: THORACIC SURGERY (CARDIOTHORACIC VASCULAR SURGERY)

## 2017-10-05 RX ORDER — PANTOPRAZOLE SODIUM 40 MG/1
40 TABLET, DELAYED RELEASE ORAL DAILY
Qty: 90 TABLET | Refills: 1 | Status: SHIPPED | OUTPATIENT
Start: 2017-10-05 | End: 2018-10-05

## 2017-10-05 RX ORDER — HYDROCHLOROTHIAZIDE 25 MG/1
25 TABLET ORAL DAILY
Qty: 30 TABLET | Refills: 5 | Status: SHIPPED | OUTPATIENT
Start: 2017-10-05

## 2017-10-05 RX ORDER — ATORVASTATIN CALCIUM 80 MG/1
80 TABLET, FILM COATED ORAL NIGHTLY
Qty: 90 TABLET | Refills: 1 | Status: SHIPPED | OUTPATIENT
Start: 2017-10-05

## 2017-10-05 RX ORDER — AMLODIPINE BESYLATE 10 MG/1
10 TABLET ORAL DAILY
Qty: 30 TABLET | Refills: 4 | Status: SHIPPED | OUTPATIENT
Start: 2017-10-05 | End: 2018-10-05

## 2017-10-05 RX ORDER — CLOPIDOGREL BISULFATE 75 MG/1
75 TABLET ORAL
COMMUNITY
Start: 2017-10-03 | End: 2018-10-03

## 2017-10-05 RX ORDER — HYDRALAZINE HYDROCHLORIDE 50 MG/1
25 TABLET, FILM COATED ORAL 3 TIMES DAILY
Qty: 90 TABLET | Refills: 5 | Status: SHIPPED | OUTPATIENT
Start: 2017-10-05

## 2017-10-05 RX ORDER — CLOPIDOGREL BISULFATE 75 MG/1
75 TABLET ORAL DAILY
Qty: 30 TABLET | Refills: 5 | Status: SHIPPED | OUTPATIENT
Start: 2017-10-05 | End: 2017-10-05

## 2017-10-05 RX ORDER — METOPROLOL TARTRATE 25 MG/1
25 TABLET, FILM COATED ORAL 2 TIMES DAILY
Qty: 60 TABLET | Refills: 5 | Status: SHIPPED | OUTPATIENT
Start: 2017-10-05

## 2017-10-05 NOTE — PROGRESS NOTES
OFFICE NOTE    This is a 71-year-old gentleman who underwent recent angiography of his   extremities and was found to have a popliteal artery occlusion on the left.    This was unable to be reopened.  The patient has an ulcer on the left foot   between the first and second toes.  Medicines are noted.  His problem list is   reviewed.  He has longstanding diabetes and hypertension.    On exam today:  VITAL SIGNS:  Stable.   CHEST:  Clear.  HEART:  Regular rate and rhythm.  ABDOMEN:  Benign.  EXTREMITIES:  Pulse is diminished in the feet.  He has an ulcer, which is about   1.5 cm in greatest transverse dimension between the first and second toes.  On   the second toe, this is dry.  I do not detect any evidence of infection.  There   is no drainage.  I explained to the patient that close observation is warranted.    If there is no improvement and certainly any worsening, he may be best served   with a distal bypass given the angiographic findings.  We will continue to   follow this and I will look at the wound again next week.      OTONIEL  dd: 10/05/2017 08:55:33 (CDT)  td: 10/05/2017 23:56:35 (CDT)  Doc ID   #[id number]  Job ID #[id number]    CC:

## 2017-10-06 ENCOUNTER — OFFICE VISIT (OUTPATIENT)
Dept: PODIATRY | Facility: CLINIC | Age: 72
End: 2017-10-06
Payer: MEDICARE

## 2017-10-06 VITALS — WEIGHT: 149.94 LBS | BODY MASS INDEX: 28.31 KG/M2 | HEIGHT: 61 IN

## 2017-10-06 DIAGNOSIS — E11.621 DIABETIC ULCER OF TOE OF LEFT FOOT ASSOCIATED WITH TYPE 2 DIABETES MELLITUS, WITH FAT LAYER EXPOSED: Primary | ICD-10-CM

## 2017-10-06 DIAGNOSIS — L97.522 DIABETIC ULCER OF TOE OF LEFT FOOT ASSOCIATED WITH TYPE 2 DIABETES MELLITUS, WITH FAT LAYER EXPOSED: Primary | ICD-10-CM

## 2017-10-06 DIAGNOSIS — E11.42 DIABETIC POLYNEUROPATHY ASSOCIATED WITH TYPE 2 DIABETES MELLITUS: ICD-10-CM

## 2017-10-06 DIAGNOSIS — I73.9 PVD (PERIPHERAL VASCULAR DISEASE): ICD-10-CM

## 2017-10-06 PROCEDURE — 99499 UNLISTED E&M SERVICE: CPT | Mod: S$GLB,,, | Performed by: PODIATRIST

## 2017-10-06 PROCEDURE — 99999 PR PBB SHADOW E&M-EST. PATIENT-LVL II: CPT | Mod: PBBFAC,,, | Performed by: PODIATRIST

## 2017-10-06 PROCEDURE — 99213 OFFICE O/P EST LOW 20 MIN: CPT | Mod: S$GLB,,, | Performed by: PODIATRIST

## 2017-10-06 NOTE — PROGRESS NOTES
Subjective:      Patient ID: Chema Ambrose is a 71 y.o. male.    Chief Complaint: Wound Care (left ) and Diabetes Mellitus  Patient presents to clinic for a 1 week wound check of the Lt. 2nd toe.  Relates continued pain to the toe with direct pressure and denies pain while at rest.  Kept the previous clinic dressing clean, dry, and intact until seen by Vascular yesterday.  Relates minimal ambulation to the affected extremity.  Relates going for his MRI, however, he was unable to tolerate this process on account of back and overall joint pain.  Denies having N/V/F/C/D.  Denies any additional pedal complaints.    Review of Systems   Constitution: Negative for chills, decreased appetite, diaphoresis and fever.   Cardiovascular: Positive for leg swelling. Negative for claudication.   Skin: Positive for color change, dry skin and nail changes.   Musculoskeletal: Positive for arthritis. Negative for back pain, falls, gout, joint pain and myalgias.   Neurological: Positive for numbness. Negative for paresthesias.   Psychiatric/Behavioral: Negative for altered mental status.           Objective:      Physical Exam   Constitutional: He is oriented to person, place, and time. He appears well-developed and well-nourished. No distress.   Cardiovascular:   Pulses:       Dorsalis pedis pulses are 1+ on the right side, and 1+ on the left side.        Posterior tibial pulses are 1+ on the right side, and 1+ on the left side.   CFT 3 seconds bilateral with the exception of 4 seconds to the Lt. 2nd toe.  Pedal hair growth decreased bilateral.   No varicosities noted bilateral. Mild nonpitting edema noted to bilateral lower extremity.  Toes are cool to touch with increasing warmness proximal.       Musculoskeletal: Normal range of motion. He exhibits edema and tenderness.   Muscle strength 5/5 in all muscle groups bilateral.  No tenderness nor crepitation with ROM of foot/ankle joints bilateral.  Pain with palpation of the Lt.  2nd toe wound.  Bilateral rectus foot type.       Neurological: He is alert and oriented to person, place, and time. He has normal strength. A sensory deficit is present.   Protective sensation per [place] monofilament intact bilateral.    Vibratory sensation decreased bilateral.    Light touch intact bilateral.   Skin: Skin is warm and dry. Lesion noted. No abrasion, no bruising, no burn, no ecchymosis, no laceration, no petechiae and no rash noted. He is not diaphoretic. No cyanosis or erythema. No pallor. Nails show no clubbing.   Open wound noted to the medial aspect of the Lt. 2nd PIP joint.  Wound base is down to subQ.  Base is comprised of fibrinous tissue.  Zoey wound is mildly edematous and macerated.  More normal coloration noted to the entire digit with today's exam.  Zoey wound is devoid of erythema, fluctuance, purulence, and malodor.  Measures 2 x 1.5 x 0.2cm.  Worse in comparison to previous exam.               Assessment:       Encounter Diagnoses   Name Primary?    Diabetic ulcer of toe of left foot associated with type 2 diabetes mellitus, with fat layer exposed Yes    PVD (peripheral vascular disease)     Diabetic polyneuropathy associated with type 2 diabetes mellitus          Plan:       Chema was seen today for wound care and diabetes mellitus.    Diagnoses and all orders for this visit:    Diabetic ulcer of toe of left foot associated with type 2 diabetes mellitus, with fat layer exposed    PVD (peripheral vascular disease)    Diabetic polyneuropathy associated with type 2 diabetes mellitus      I counseled the patient on his conditions, their implications and medical management.    No wound debridement performed with today's exam due to compromised vascular status.    Wound base covered with silver alginate and splinted from the adjacent toe.  A modified football dressing was applied with minimal tension to the bandage.    Likely osteomyelitis of the affected toe as wound  dimensions worsen at each visit and the wound base is devoid of any signs of budding granulation.  Unfortunately, unable to confirm on MRI due to patient's intolerance to laying down in the machine.  Will likely require additional vascular intervention per Dr. Alston to prevent future amputation of the digit.  Explained that amputation may still be a possibility should revascularization fail to heal the wound.    Advised to keep today's dressing CDI x 1 week.    Advised to rest and elevate the affected extremity.    Recommend minimal ambulation to facilitate wound healing.    RTC in 1 week for wound check.    Shahriar Moore DPM

## 2017-10-11 ENCOUNTER — TELEPHONE (OUTPATIENT)
Dept: VASCULAR SURGERY | Facility: CLINIC | Age: 72
End: 2017-10-11

## 2017-10-11 NOTE — TELEPHONE ENCOUNTER
----- Message from Svitlana Dwyer sent at 10/11/2017 11:12 AM CDT -----  Contact: patient's wife and daughter came to my desk  Patient is currently in [facility].    Please call the patient's wife to reschedule his appointment [phone].

## 2017-10-13 ENCOUNTER — OUTSIDE PLACE OF SERVICE (OUTPATIENT)
Dept: ADMINISTRATIVE | Facility: OTHER | Age: 72
End: 2017-10-13
Payer: MEDICARE

## 2017-10-13 PROCEDURE — 99232 SBSQ HOSP IP/OBS MODERATE 35: CPT | Mod: ,,, | Performed by: THORACIC SURGERY (CARDIOTHORACIC VASCULAR SURGERY)

## 2017-10-16 ENCOUNTER — OUTSIDE PLACE OF SERVICE (OUTPATIENT)
Dept: ADMINISTRATIVE | Facility: OTHER | Age: 72
End: 2017-10-16
Payer: MEDICARE

## 2017-10-16 PROCEDURE — 35671 BPG POP-TIBL/-PRNL ARTERY: CPT | Mod: LT,,, | Performed by: THORACIC SURGERY (CARDIOTHORACIC VASCULAR SURGERY)

## 2017-11-02 ENCOUNTER — TELEPHONE (OUTPATIENT)
Dept: ENDOCRINOLOGY | Facility: CLINIC | Age: 72
End: 2017-11-02

## 2017-11-02 NOTE — TELEPHONE ENCOUNTER
Homa's pt. Received BG logs. Pre-dinner glucoses are very elevated. The rest are pretty acceptable. Please call patient and advise him to increase breakfast NPH dose to 26 units. Continue 15 units of NPH with dinner and 7 units of regular insulin with breakfast and dinner. Thank you!

## 2017-11-02 NOTE — TELEPHONE ENCOUNTER
The patient's granddaughter and wife were both given instructions on his new breakfast NPH insulin dose. He was taking 20 units of NPH at breakfast, and TALIB Padron has increased the NPH to 26 units at breakfast. The granddaughter wrote down the new instructions and read them back to me.

## 2017-11-13 ENCOUNTER — TELEPHONE (OUTPATIENT)
Dept: INTERNAL MEDICINE | Facility: CLINIC | Age: 72
End: 2017-11-13

## 2017-11-13 NOTE — TELEPHONE ENCOUNTER
Dr Garcia  We have not been able to find out as of yet. I will touch base with Nell and see if she can locate him. Family hangs up phone on us.